# Patient Record
Sex: FEMALE | Race: WHITE | Employment: UNEMPLOYED | URBAN - METROPOLITAN AREA
[De-identification: names, ages, dates, MRNs, and addresses within clinical notes are randomized per-mention and may not be internally consistent; named-entity substitution may affect disease eponyms.]

---

## 2017-02-15 ENCOUNTER — OFFICE VISIT (OUTPATIENT)
Dept: FAMILY MEDICINE CLINIC | Age: 37
End: 2017-02-15

## 2017-02-15 VITALS
RESPIRATION RATE: 16 BRPM | WEIGHT: 202 LBS | TEMPERATURE: 98.8 F | HEART RATE: 85 BPM | DIASTOLIC BLOOD PRESSURE: 62 MMHG | HEIGHT: 69 IN | SYSTOLIC BLOOD PRESSURE: 106 MMHG | BODY MASS INDEX: 29.92 KG/M2 | OXYGEN SATURATION: 98 %

## 2017-02-15 DIAGNOSIS — K52.9 ACUTE GASTROENTERITIS: Primary | ICD-10-CM

## 2017-02-15 PROCEDURE — 99214 OFFICE O/P EST MOD 30 MIN: CPT | Performed by: FAMILY MEDICINE

## 2017-02-15 RX ORDER — ONDANSETRON 4 MG/1
4 TABLET, FILM COATED ORAL EVERY 8 HOURS PRN
Qty: 30 TABLET | Refills: 0 | Status: SHIPPED | OUTPATIENT
Start: 2017-02-15 | End: 2018-12-05 | Stop reason: CLARIF

## 2017-02-15 ASSESSMENT — ENCOUNTER SYMPTOMS
SHORTNESS OF BREATH: 0
RECTAL PAIN: 0
BACK PAIN: 0
CHOKING: 0
DIARRHEA: 1
NAUSEA: 1
CHEST TIGHTNESS: 0
VOMITING: 1
BLOOD IN STOOL: 0
STRIDOR: 0
ABDOMINAL DISTENTION: 0
WHEEZING: 0
ANAL BLEEDING: 0
CONSTIPATION: 0
ABDOMINAL PAIN: 0
COUGH: 0
APNEA: 0

## 2017-03-30 ENCOUNTER — OFFICE VISIT (OUTPATIENT)
Dept: FAMILY MEDICINE CLINIC | Age: 37
End: 2017-03-30

## 2017-03-30 VITALS
HEIGHT: 69 IN | BODY MASS INDEX: 30.18 KG/M2 | WEIGHT: 203.8 LBS | OXYGEN SATURATION: 97 % | DIASTOLIC BLOOD PRESSURE: 78 MMHG | HEART RATE: 98 BPM | SYSTOLIC BLOOD PRESSURE: 112 MMHG | TEMPERATURE: 99 F

## 2017-03-30 DIAGNOSIS — J20.9 ACUTE BRONCHITIS, UNSPECIFIED ORGANISM: ICD-10-CM

## 2017-03-30 DIAGNOSIS — J01.10 ACUTE NON-RECURRENT FRONTAL SINUSITIS: ICD-10-CM

## 2017-03-30 PROCEDURE — 99213 OFFICE O/P EST LOW 20 MIN: CPT | Performed by: NURSE PRACTITIONER

## 2017-03-30 RX ORDER — AZITHROMYCIN 250 MG/1
TABLET, FILM COATED ORAL
Qty: 1 PACKET | Refills: 0 | Status: SHIPPED | OUTPATIENT
Start: 2017-03-30 | End: 2017-07-20 | Stop reason: SDUPTHER

## 2017-03-30 RX ORDER — CHOLECALCIFEROL (VITAMIN D3) 125 MCG
500 CAPSULE ORAL DAILY
COMMUNITY
End: 2022-01-24

## 2017-03-30 RX ORDER — PREDNISONE 20 MG/1
20 TABLET ORAL DAILY
Qty: 5 TABLET | Refills: 0 | Status: SHIPPED | OUTPATIENT
Start: 2017-03-30 | End: 2017-07-20 | Stop reason: SDUPTHER

## 2017-03-30 RX ORDER — ALBUTEROL SULFATE 90 UG/1
2 AEROSOL, METERED RESPIRATORY (INHALATION) EVERY 4 HOURS PRN
Qty: 2 INHALER | Refills: 3 | Status: SHIPPED | OUTPATIENT
Start: 2017-03-30 | End: 2019-05-29 | Stop reason: CLARIF

## 2017-03-30 ASSESSMENT — ENCOUNTER SYMPTOMS
ALLERGIC/IMMUNOLOGIC NEGATIVE: 1
CONSTIPATION: 0
CHOKING: 0
ABDOMINAL PAIN: 0
COUGH: 1
SORE THROAT: 0
FACIAL SWELLING: 0
VOMITING: 0
NAUSEA: 0
DIARRHEA: 0
ABDOMINAL DISTENTION: 0
CHEST TIGHTNESS: 0
SHORTNESS OF BREATH: 0
APNEA: 0
EYES NEGATIVE: 1
COLOR CHANGE: 0
TROUBLE SWALLOWING: 0
BLOOD IN STOOL: 0
SINUS PAIN: 1
VOICE CHANGE: 0
ANAL BLEEDING: 0
WHEEZING: 0
BACK PAIN: 0
RHINORRHEA: 1
SWOLLEN GLANDS: 1
STRIDOR: 0
GASTROINTESTINAL NEGATIVE: 1

## 2017-05-25 ENCOUNTER — OFFICE VISIT (OUTPATIENT)
Dept: FAMILY MEDICINE CLINIC | Age: 37
End: 2017-05-25

## 2017-05-25 VITALS
WEIGHT: 209 LBS | HEART RATE: 86 BPM | BODY MASS INDEX: 30.96 KG/M2 | OXYGEN SATURATION: 98 % | SYSTOLIC BLOOD PRESSURE: 116 MMHG | TEMPERATURE: 99 F | HEIGHT: 69 IN | DIASTOLIC BLOOD PRESSURE: 76 MMHG | RESPIRATION RATE: 16 BRPM

## 2017-05-25 DIAGNOSIS — F17.200 TOBACCO USE DISORDER: ICD-10-CM

## 2017-05-25 DIAGNOSIS — R11.0 NAUSEA: ICD-10-CM

## 2017-05-25 DIAGNOSIS — N92.6 MENSTRUAL PROBLEM: Primary | ICD-10-CM

## 2017-05-25 DIAGNOSIS — N92.6 MENSTRUAL PROBLEM: ICD-10-CM

## 2017-05-25 DIAGNOSIS — Z32.01 PREGNANCY TEST POSITIVE: ICD-10-CM

## 2017-05-25 DIAGNOSIS — E78.6 LOW HDL (UNDER 40): ICD-10-CM

## 2017-05-25 DIAGNOSIS — E78.00 HYPERCHOLESTEREMIA: ICD-10-CM

## 2017-05-25 LAB
CONTROL: NORMAL
GONADOTROPIN, CHORIONIC (HCG) QUANT: <5 MIU/ML
PREGNANCY TEST URINE, POC: NEGATIVE

## 2017-05-25 PROCEDURE — 81025 URINE PREGNANCY TEST: CPT | Performed by: FAMILY MEDICINE

## 2017-05-25 PROCEDURE — 99214 OFFICE O/P EST MOD 30 MIN: CPT | Performed by: FAMILY MEDICINE

## 2017-05-25 ASSESSMENT — ENCOUNTER SYMPTOMS
ABDOMINAL PAIN: 0
BLOOD IN STOOL: 0
CHEST TIGHTNESS: 0
NAUSEA: 0
VOMITING: 0
COUGH: 0
APNEA: 0
CHOKING: 0
WHEEZING: 0
ABDOMINAL DISTENTION: 0
RECTAL PAIN: 0
DIARRHEA: 0
ANAL BLEEDING: 0
STRIDOR: 0
SHORTNESS OF BREATH: 0
CONSTIPATION: 0

## 2017-06-08 ENCOUNTER — OFFICE VISIT (OUTPATIENT)
Dept: FAMILY MEDICINE CLINIC | Age: 37
End: 2017-06-08

## 2017-06-08 VITALS
BODY MASS INDEX: 31.58 KG/M2 | HEIGHT: 68 IN | DIASTOLIC BLOOD PRESSURE: 70 MMHG | SYSTOLIC BLOOD PRESSURE: 112 MMHG | RESPIRATION RATE: 16 BRPM | WEIGHT: 208.4 LBS | HEART RATE: 88 BPM | TEMPERATURE: 98.6 F | OXYGEN SATURATION: 98 %

## 2017-06-08 DIAGNOSIS — B96.89 BACTERIAL VAGINOSIS: ICD-10-CM

## 2017-06-08 DIAGNOSIS — N92.6 IRREGULAR MENSES: ICD-10-CM

## 2017-06-08 DIAGNOSIS — N64.4 PAIN OF BOTH BREASTS: Primary | ICD-10-CM

## 2017-06-08 DIAGNOSIS — R82.998 LEUKOCYTES IN URINE: ICD-10-CM

## 2017-06-08 DIAGNOSIS — N89.8 VAGINAL DISCHARGE: ICD-10-CM

## 2017-06-08 DIAGNOSIS — R10.2 PELVIC PAIN IN FEMALE: ICD-10-CM

## 2017-06-08 DIAGNOSIS — N76.0 BACTERIAL VAGINOSIS: ICD-10-CM

## 2017-06-08 LAB
BILIRUBIN, POC: NORMAL
BLOOD URINE, POC: ABNORMAL
CLARITY, POC: ABNORMAL
COLOR, POC: YELLOW
CONTROL: NORMAL
GLUCOSE URINE, POC: NORMAL
KETONES, POC: NEGATIVE
LEUKOCYTE EST, POC: ABNORMAL
NITRITE, POC: NEGATIVE
PH, POC: 5
PREGNANCY TEST URINE, POC: NEGATIVE
PROTEIN, POC: NEGATIVE
SPECIFIC GRAVITY, POC: 1.02
UROBILINOGEN, POC: NORMAL

## 2017-06-08 PROCEDURE — 81002 URINALYSIS NONAUTO W/O SCOPE: CPT | Performed by: FAMILY MEDICINE

## 2017-06-08 PROCEDURE — 99214 OFFICE O/P EST MOD 30 MIN: CPT | Performed by: FAMILY MEDICINE

## 2017-06-08 PROCEDURE — 81025 URINE PREGNANCY TEST: CPT | Performed by: FAMILY MEDICINE

## 2017-06-08 PROCEDURE — 87210 SMEAR WET MOUNT SALINE/INK: CPT | Performed by: FAMILY MEDICINE

## 2017-06-08 RX ORDER — NAPROXEN 500 MG/1
500 TABLET ORAL 2 TIMES DAILY WITH MEALS
Qty: 20 TABLET | Refills: 0 | Status: SHIPPED | OUTPATIENT
Start: 2017-06-08 | End: 2018-12-05 | Stop reason: CLARIF

## 2017-06-08 RX ORDER — METRONIDAZOLE 500 MG/1
500 TABLET ORAL 2 TIMES DAILY
Qty: 14 TABLET | Refills: 0 | Status: SHIPPED | OUTPATIENT
Start: 2017-06-08 | End: 2017-06-15

## 2017-06-08 ASSESSMENT — ENCOUNTER SYMPTOMS
WHEEZING: 0
VOMITING: 0
APNEA: 0
COUGH: 0
NAUSEA: 0
CONSTIPATION: 0
CHEST TIGHTNESS: 0
ABDOMINAL PAIN: 0
DIARRHEA: 0
ABDOMINAL DISTENTION: 0
RECTAL PAIN: 0
CHOKING: 0
ANAL BLEEDING: 0
SHORTNESS OF BREATH: 0
STRIDOR: 0
BLOOD IN STOOL: 0

## 2017-06-09 ENCOUNTER — TELEPHONE (OUTPATIENT)
Dept: FAMILY MEDICINE CLINIC | Age: 37
End: 2017-06-09

## 2017-06-09 LAB
C TRACH DNA GENITAL QL NAA+PROBE: NEGATIVE
N. GONORRHOEAE DNA: NEGATIVE

## 2017-06-09 RX ORDER — CIPROFLOXACIN 250 MG/1
250 TABLET, FILM COATED ORAL 2 TIMES DAILY
Qty: 10 TABLET | Refills: 0 | Status: SHIPPED | OUTPATIENT
Start: 2017-06-09 | End: 2017-06-12 | Stop reason: ALTCHOICE

## 2017-06-10 LAB
ORGANISM: ABNORMAL
URINE CULTURE, ROUTINE: ABNORMAL

## 2017-06-12 ENCOUNTER — TELEPHONE (OUTPATIENT)
Dept: FAMILY MEDICINE CLINIC | Age: 37
End: 2017-06-12

## 2017-06-12 DIAGNOSIS — N30.00 ACUTE CYSTITIS WITHOUT HEMATURIA: Primary | ICD-10-CM

## 2017-06-12 RX ORDER — NITROFURANTOIN 25; 75 MG/1; MG/1
100 CAPSULE ORAL 2 TIMES DAILY
Qty: 14 CAPSULE | Refills: 0 | Status: SHIPPED | OUTPATIENT
Start: 2017-06-12 | End: 2017-06-19

## 2017-06-12 RX ORDER — NITROFURANTOIN 25; 75 MG/1; MG/1
100 CAPSULE ORAL 2 TIMES DAILY
Qty: 14 CAPSULE | Refills: 0 | OUTPATIENT
Start: 2017-06-12 | End: 2017-06-12 | Stop reason: SDUPTHER

## 2017-06-14 ENCOUNTER — TELEPHONE (OUTPATIENT)
Dept: FAMILY MEDICINE CLINIC | Age: 37
End: 2017-06-14

## 2017-06-14 DIAGNOSIS — R10.9 ABDOMINAL PAIN, UNSPECIFIED LOCATION: Primary | ICD-10-CM

## 2017-06-14 DIAGNOSIS — R19.00 ABDOMINAL SWELLING: ICD-10-CM

## 2017-06-15 ENCOUNTER — TELEPHONE (OUTPATIENT)
Dept: FAMILY MEDICINE CLINIC | Age: 37
End: 2017-06-15

## 2017-06-15 DIAGNOSIS — F41.9 ANXIETY: ICD-10-CM

## 2017-06-15 DIAGNOSIS — F41.0 PANIC ATTACK: ICD-10-CM

## 2017-06-15 DIAGNOSIS — F33.1 MAJOR DEPRESSIVE DISORDER, RECURRENT EPISODE, MODERATE (HCC): ICD-10-CM

## 2017-06-15 RX ORDER — FLUOXETINE HYDROCHLORIDE 40 MG/1
CAPSULE ORAL
Qty: 90 CAPSULE | Refills: 0 | Status: SHIPPED | OUTPATIENT
Start: 2017-06-15 | End: 2017-09-08 | Stop reason: SDUPTHER

## 2017-06-27 ENCOUNTER — OFFICE VISIT (OUTPATIENT)
Dept: FAMILY MEDICINE CLINIC | Age: 37
End: 2017-06-27

## 2017-06-27 VITALS
RESPIRATION RATE: 16 BRPM | SYSTOLIC BLOOD PRESSURE: 112 MMHG | HEIGHT: 68 IN | BODY MASS INDEX: 31.42 KG/M2 | TEMPERATURE: 98.6 F | WEIGHT: 207.3 LBS | HEART RATE: 65 BPM | OXYGEN SATURATION: 98 % | DIASTOLIC BLOOD PRESSURE: 76 MMHG

## 2017-06-27 DIAGNOSIS — N20.0 RENAL STONE: ICD-10-CM

## 2017-06-27 DIAGNOSIS — R10.2 PELVIC PAIN IN FEMALE: ICD-10-CM

## 2017-06-27 DIAGNOSIS — R91.1 LUNG NODULE: ICD-10-CM

## 2017-06-27 DIAGNOSIS — K21.9 GASTROESOPHAGEAL REFLUX DISEASE WITHOUT ESOPHAGITIS: ICD-10-CM

## 2017-06-27 DIAGNOSIS — R16.0 LIVER MASS, RIGHT LOBE: ICD-10-CM

## 2017-06-27 DIAGNOSIS — R10.84 ABDOMINAL PAIN, GENERALIZED: Primary | ICD-10-CM

## 2017-06-27 DIAGNOSIS — F17.200 TOBACCO USE DISORDER: ICD-10-CM

## 2017-06-27 DIAGNOSIS — D18.03 LIVER HEMANGIOMA: ICD-10-CM

## 2017-06-27 DIAGNOSIS — N64.4 PAIN OF BOTH BREASTS: ICD-10-CM

## 2017-06-27 DIAGNOSIS — K44.9 HIATAL HERNIA: ICD-10-CM

## 2017-06-27 PROCEDURE — 99214 OFFICE O/P EST MOD 30 MIN: CPT | Performed by: FAMILY MEDICINE

## 2017-06-27 RX ORDER — PANTOPRAZOLE SODIUM 20 MG/1
20 TABLET, DELAYED RELEASE ORAL DAILY
Qty: 30 TABLET | Refills: 3 | Status: SHIPPED | OUTPATIENT
Start: 2017-06-27 | End: 2018-03-16 | Stop reason: SDUPTHER

## 2017-06-27 ASSESSMENT — ENCOUNTER SYMPTOMS
APNEA: 0
CHOKING: 0
SHORTNESS OF BREATH: 0
CONSTIPATION: 0
ANAL BLEEDING: 0
VOMITING: 0
COUGH: 0
STRIDOR: 0
WHEEZING: 0
ABDOMINAL PAIN: 0
CHEST TIGHTNESS: 0
ABDOMINAL DISTENTION: 0
RECTAL PAIN: 0
NAUSEA: 0
BLOOD IN STOOL: 0
DIARRHEA: 0

## 2017-07-01 ENCOUNTER — HOSPITAL ENCOUNTER (OUTPATIENT)
Dept: GENERAL RADIOLOGY | Facility: MEDICAL CENTER | Age: 37
Discharge: HOME | End: 2017-07-01
Attending: FAMILY MEDICINE | Admitting: FAMILY MEDICINE

## 2017-07-01 DIAGNOSIS — R91.1 LUNG NODULE: ICD-10-CM

## 2017-07-01 DIAGNOSIS — F17.200 TOBACCO USE DISORDER: ICD-10-CM

## 2017-07-05 DIAGNOSIS — D18.03 LIVER HEMANGIOMA: ICD-10-CM

## 2017-07-05 DIAGNOSIS — K76.9 LIVER LESION: Primary | ICD-10-CM

## 2017-07-20 ENCOUNTER — OFFICE VISIT (OUTPATIENT)
Dept: FAMILY MEDICINE CLINIC | Age: 37
End: 2017-07-20

## 2017-07-20 ENCOUNTER — TELEPHONE (OUTPATIENT)
Dept: FAMILY MEDICINE CLINIC | Age: 37
End: 2017-07-20

## 2017-07-20 VITALS
OXYGEN SATURATION: 99 % | SYSTOLIC BLOOD PRESSURE: 120 MMHG | WEIGHT: 203.5 LBS | HEART RATE: 72 BPM | TEMPERATURE: 98.8 F | BODY MASS INDEX: 30.14 KG/M2 | DIASTOLIC BLOOD PRESSURE: 78 MMHG | HEIGHT: 69 IN

## 2017-07-20 DIAGNOSIS — J01.10 ACUTE NON-RECURRENT FRONTAL SINUSITIS: ICD-10-CM

## 2017-07-20 DIAGNOSIS — Z11.4 SCREENING FOR HIV WITHOUT PRESENCE OF RISK FACTORS: ICD-10-CM

## 2017-07-20 DIAGNOSIS — Z11.4 SCREENING FOR HIV WITHOUT PRESENCE OF RISK FACTORS: Primary | ICD-10-CM

## 2017-07-20 PROCEDURE — 99213 OFFICE O/P EST LOW 20 MIN: CPT | Performed by: NURSE PRACTITIONER

## 2017-07-20 RX ORDER — AZITHROMYCIN 250 MG/1
TABLET, FILM COATED ORAL
Qty: 1 PACKET | Refills: 0 | Status: SHIPPED | OUTPATIENT
Start: 2017-07-20 | End: 2017-07-30

## 2017-07-20 RX ORDER — PREDNISONE 20 MG/1
20 TABLET ORAL DAILY
Qty: 5 TABLET | Refills: 0 | Status: SHIPPED | OUTPATIENT
Start: 2017-07-20 | End: 2017-07-25

## 2017-07-20 ASSESSMENT — ENCOUNTER SYMPTOMS
BLOOD IN STOOL: 0
GASTROINTESTINAL NEGATIVE: 1
BACK PAIN: 0
STRIDOR: 0
SHORTNESS OF BREATH: 0
WHEEZING: 0
RHINORRHEA: 1
DIARRHEA: 0
COLOR CHANGE: 0
CHOKING: 0
NAUSEA: 0
ALLERGIC/IMMUNOLOGIC NEGATIVE: 1
ABDOMINAL DISTENTION: 0
SWOLLEN GLANDS: 1
TROUBLE SWALLOWING: 0
COUGH: 1
CONSTIPATION: 0
ANAL BLEEDING: 0
EYES NEGATIVE: 1
APNEA: 0
VOICE CHANGE: 0
FACIAL SWELLING: 0
CHEST TIGHTNESS: 0
VOMITING: 0
SINUS PAIN: 1
ABDOMINAL PAIN: 0
SORE THROAT: 1

## 2017-07-21 LAB — HIV-1 AND HIV-2 ANTIBODIES: NORMAL

## 2017-08-02 ENCOUNTER — TELEPHONE (OUTPATIENT)
Dept: FAMILY MEDICINE CLINIC | Age: 37
End: 2017-08-02

## 2017-08-15 ENCOUNTER — TELEPHONE (OUTPATIENT)
Dept: FAMILY MEDICINE CLINIC | Age: 37
End: 2017-08-15

## 2017-09-08 ENCOUNTER — TELEPHONE (OUTPATIENT)
Dept: FAMILY MEDICINE CLINIC | Age: 37
End: 2017-09-08

## 2017-09-08 DIAGNOSIS — F33.1 MAJOR DEPRESSIVE DISORDER, RECURRENT EPISODE, MODERATE (HCC): ICD-10-CM

## 2017-09-08 DIAGNOSIS — F41.0 PANIC ATTACK: ICD-10-CM

## 2017-09-08 DIAGNOSIS — F41.9 ANXIETY: ICD-10-CM

## 2017-09-08 RX ORDER — FLUOXETINE HYDROCHLORIDE 40 MG/1
CAPSULE ORAL
Qty: 90 CAPSULE | Refills: 0 | Status: SHIPPED | OUTPATIENT
Start: 2017-09-08 | End: 2017-12-12 | Stop reason: SDUPTHER

## 2017-09-12 ENCOUNTER — TELEPHONE (OUTPATIENT)
Dept: FAMILY MEDICINE CLINIC | Age: 37
End: 2017-09-12

## 2017-09-26 ENCOUNTER — TELEPHONE (OUTPATIENT)
Dept: FAMILY MEDICINE CLINIC | Age: 37
End: 2017-09-26

## 2017-09-28 ENCOUNTER — OFFICE VISIT (OUTPATIENT)
Dept: FAMILY MEDICINE CLINIC | Age: 37
End: 2017-09-28

## 2017-09-28 VITALS
SYSTOLIC BLOOD PRESSURE: 122 MMHG | WEIGHT: 206 LBS | HEART RATE: 93 BPM | TEMPERATURE: 99 F | DIASTOLIC BLOOD PRESSURE: 70 MMHG | OXYGEN SATURATION: 99 % | BODY MASS INDEX: 30.87 KG/M2

## 2017-09-28 DIAGNOSIS — R10.2 PELVIC PAIN IN FEMALE: ICD-10-CM

## 2017-09-28 DIAGNOSIS — R91.1 LUNG NODULE: ICD-10-CM

## 2017-09-28 DIAGNOSIS — K63.89 MASS OF COLON: ICD-10-CM

## 2017-09-28 DIAGNOSIS — R10.84 GENERALIZED ABDOMINAL PAIN: ICD-10-CM

## 2017-09-28 DIAGNOSIS — D25.9 UTERINE LEIOMYOMA, UNSPECIFIED LOCATION: ICD-10-CM

## 2017-09-28 DIAGNOSIS — K21.9 GASTROESOPHAGEAL REFLUX DISEASE WITHOUT ESOPHAGITIS: ICD-10-CM

## 2017-09-28 DIAGNOSIS — K59.00 CONSTIPATION, UNSPECIFIED CONSTIPATION TYPE: ICD-10-CM

## 2017-09-28 DIAGNOSIS — R32 URINARY INCONTINENCE, UNSPECIFIED TYPE: Primary | ICD-10-CM

## 2017-09-28 DIAGNOSIS — N20.0 RENAL STONE: ICD-10-CM

## 2017-09-28 DIAGNOSIS — K76.9 LIVER LESION: ICD-10-CM

## 2017-09-28 LAB
BILIRUBIN, POC: NORMAL
BLOOD URINE, POC: NORMAL
CLARITY, POC: CLEAR
COLOR, POC: YELLOW
GLUCOSE URINE, POC: NORMAL
KETONES, POC: NORMAL
LEUKOCYTE EST, POC: NORMAL
NITRITE, POC: NORMAL
PH, POC: 6
PROTEIN, POC: NORMAL
SPECIFIC GRAVITY, POC: 1.02
UROBILINOGEN, POC: NORMAL

## 2017-09-28 PROCEDURE — 99214 OFFICE O/P EST MOD 30 MIN: CPT | Performed by: FAMILY MEDICINE

## 2017-09-28 PROCEDURE — 81002 URINALYSIS NONAUTO W/O SCOPE: CPT | Performed by: FAMILY MEDICINE

## 2017-09-28 ASSESSMENT — ENCOUNTER SYMPTOMS
STRIDOR: 0
RECTAL PAIN: 0
VOMITING: 0
ANAL BLEEDING: 0
CHOKING: 0
BLOOD IN STOOL: 0
SHORTNESS OF BREATH: 0
WHEEZING: 0
ABDOMINAL DISTENTION: 0
COUGH: 0
DIARRHEA: 0
ABDOMINAL PAIN: 1
APNEA: 0
CHEST TIGHTNESS: 0
NAUSEA: 0
CONSTIPATION: 0

## 2017-09-29 LAB
C. TRACHOMATIS DNA ,URINE: NEGATIVE
HAV IGM SER IA-ACNC: NORMAL
HEPATITIS B CORE IGM ANTIBODY: NORMAL
HEPATITIS B SURFACE ANTIGEN INTERPRETATION: NORMAL
HEPATITIS C ANTIBODY INTERPRETATION: NORMAL
N. GONORRHOEAE DNA, URINE: NEGATIVE

## 2017-12-12 ENCOUNTER — TELEPHONE (OUTPATIENT)
Dept: FAMILY MEDICINE CLINIC | Age: 37
End: 2017-12-12

## 2017-12-12 DIAGNOSIS — F41.9 ANXIETY: ICD-10-CM

## 2017-12-12 DIAGNOSIS — F41.0 PANIC ATTACK: ICD-10-CM

## 2017-12-12 DIAGNOSIS — F33.1 MAJOR DEPRESSIVE DISORDER, RECURRENT EPISODE, MODERATE (HCC): ICD-10-CM

## 2017-12-12 RX ORDER — FLUOXETINE HYDROCHLORIDE 40 MG/1
CAPSULE ORAL
Qty: 90 CAPSULE | Refills: 0 | Status: SHIPPED | OUTPATIENT
Start: 2017-12-12 | End: 2018-03-16 | Stop reason: SDUPTHER

## 2017-12-12 NOTE — TELEPHONE ENCOUNTER
Pt needs refills, pt was off work about a month ago. Kimberlee Neither mayito wanted her to do another test but she didn't like him. So if you can give her some where knew to go, If you fell she needs to have it done.     FLUoxetine (PROZAC) 40 MG capsule    Please advise  Yaw Perez [phone]

## 2018-03-16 ENCOUNTER — TELEPHONE (OUTPATIENT)
Dept: FAMILY MEDICINE CLINIC | Age: 38
End: 2018-03-16

## 2018-03-16 DIAGNOSIS — F41.9 ANXIETY: ICD-10-CM

## 2018-03-16 DIAGNOSIS — F33.1 MAJOR DEPRESSIVE DISORDER, RECURRENT EPISODE, MODERATE (HCC): ICD-10-CM

## 2018-03-16 DIAGNOSIS — F41.0 PANIC ATTACK: ICD-10-CM

## 2018-03-16 DIAGNOSIS — K21.9 GASTROESOPHAGEAL REFLUX DISEASE WITHOUT ESOPHAGITIS: ICD-10-CM

## 2018-03-16 RX ORDER — FLUOXETINE HYDROCHLORIDE 40 MG/1
CAPSULE ORAL
Qty: 90 CAPSULE | Refills: 0 | Status: SHIPPED | OUTPATIENT
Start: 2018-03-16 | End: 2018-06-15 | Stop reason: SDUPTHER

## 2018-03-16 RX ORDER — PANTOPRAZOLE SODIUM 20 MG/1
20 TABLET, DELAYED RELEASE ORAL DAILY
Qty: 30 TABLET | Refills: 2 | Status: SHIPPED | OUTPATIENT
Start: 2018-03-16 | End: 2019-05-29 | Stop reason: CLARIF

## 2018-06-15 DIAGNOSIS — F41.9 ANXIETY: ICD-10-CM

## 2018-06-15 DIAGNOSIS — F41.0 PANIC ATTACK: ICD-10-CM

## 2018-06-15 DIAGNOSIS — F33.1 MAJOR DEPRESSIVE DISORDER, RECURRENT EPISODE, MODERATE (HCC): ICD-10-CM

## 2018-06-15 RX ORDER — FLUOXETINE HYDROCHLORIDE 40 MG/1
CAPSULE ORAL
Qty: 90 CAPSULE | Refills: 0 | Status: SHIPPED | OUTPATIENT
Start: 2018-06-15 | End: 2018-10-26 | Stop reason: SDUPTHER

## 2018-08-04 ENCOUNTER — OFFICE VISIT (OUTPATIENT)
Dept: FAMILY MEDICINE CLINIC | Age: 38
End: 2018-08-04

## 2018-08-04 VITALS
BODY MASS INDEX: 25.18 KG/M2 | HEART RATE: 96 BPM | WEIGHT: 170 LBS | DIASTOLIC BLOOD PRESSURE: 64 MMHG | HEIGHT: 69 IN | RESPIRATION RATE: 16 BRPM | SYSTOLIC BLOOD PRESSURE: 110 MMHG | OXYGEN SATURATION: 98 % | TEMPERATURE: 97.4 F

## 2018-08-04 DIAGNOSIS — N39.0 URINARY TRACT INFECTION WITHOUT HEMATURIA, SITE UNSPECIFIED: Primary | ICD-10-CM

## 2018-08-04 PROCEDURE — 99213 OFFICE O/P EST LOW 20 MIN: CPT | Performed by: INTERNAL MEDICINE

## 2018-08-04 RX ORDER — FLUCONAZOLE 150 MG/1
150 TABLET ORAL ONCE
Qty: 2 TABLET | Refills: 0 | Status: SHIPPED | OUTPATIENT
Start: 2018-08-04 | End: 2018-08-04

## 2018-08-04 RX ORDER — NITROFURANTOIN 25; 75 MG/1; MG/1
100 CAPSULE ORAL 2 TIMES DAILY
Qty: 14 CAPSULE | Refills: 0 | Status: SHIPPED | OUTPATIENT
Start: 2018-08-04 | End: 2018-08-11

## 2018-08-04 ASSESSMENT — ENCOUNTER SYMPTOMS
BACK PAIN: 1
NAUSEA: 0
ABDOMINAL PAIN: 1
VOMITING: 0

## 2018-08-04 NOTE — PROGRESS NOTES
Subjective:      Patient ID: Stuart Mcdonnell is a 40 y.o. female. 2 day history of dysuria, subjective fever and chills, flank pain bilaterally, low abdominal pain. Feels similar to previous urinary tract infection. Took some AZO OTC. Symptoms have worsened since yesterday. Review of Systems   Constitutional: Positive for chills, fatigue and fever. Negative for activity change and appetite change. Gastrointestinal: Positive for abdominal pain. Negative for nausea and vomiting. Genitourinary: Positive for difficulty urinating, flank pain, frequency and urgency. Musculoskeletal: Positive for back pain. Objective:   Physical Exam   Constitutional: She appears well-developed and well-nourished. No distress. Abdominal: Soft. Bowel sounds are normal. She exhibits no distension and no mass. There is tenderness. There is no rebound and no guarding. No CVA tenderness       Assessment/Plan:  Jade Wright was seen today for flank pain and polyuria. Diagnoses and all orders for this visit:    Urinary tract infection without hematuria, site unspecified  -     URINE CULTURE      -     nitrofurantoin, macrocrystal-monohydrate, (MACROBID) 100 MG capsule; Take 1 capsule by mouth 2 times daily for 7 days    Typically gets yeast infection associated with antibiotic treatment. -     fluconazole (DIFLUCAN) 150 MG tablet; Take 1 tablet by mouth once for 1 dose May repeat in 3-5 days, if symptoms persist or recur.

## 2018-08-06 LAB
ORGANISM: ABNORMAL
URINE CULTURE, ROUTINE: ABNORMAL
URINE CULTURE, ROUTINE: ABNORMAL

## 2018-08-08 ENCOUNTER — TELEPHONE (OUTPATIENT)
Dept: FAMILY MEDICINE CLINIC | Age: 38
End: 2018-08-08

## 2018-08-08 NOTE — TELEPHONE ENCOUNTER
[phone] [phone]   Breana Quintanilla    Missed our call regarding test results. Please call again. OK to leave detailed voice mail with information.     Last seen 8/4/2018

## 2018-10-16 NOTE — TELEPHONE ENCOUNTER
Notify pt':  Med e-scribed. GI:advise pt' to check with insurance company for in- & let us know so referral can be placed. none

## 2018-10-26 DIAGNOSIS — F33.1 MAJOR DEPRESSIVE DISORDER, RECURRENT EPISODE, MODERATE (HCC): ICD-10-CM

## 2018-10-26 DIAGNOSIS — F41.0 PANIC ATTACK: ICD-10-CM

## 2018-10-26 DIAGNOSIS — F41.9 ANXIETY: ICD-10-CM

## 2018-10-26 RX ORDER — FLUOXETINE HYDROCHLORIDE 40 MG/1
CAPSULE ORAL
Qty: 30 CAPSULE | Refills: 0 | Status: SHIPPED | OUTPATIENT
Start: 2018-10-26 | End: 2018-12-05 | Stop reason: SDUPTHER

## 2018-12-05 ENCOUNTER — OFFICE VISIT (OUTPATIENT)
Dept: FAMILY MEDICINE CLINIC | Age: 38
End: 2018-12-05
Payer: COMMERCIAL

## 2018-12-05 VITALS
TEMPERATURE: 98 F | HEART RATE: 85 BPM | HEIGHT: 69 IN | RESPIRATION RATE: 16 BRPM | WEIGHT: 172 LBS | BODY MASS INDEX: 25.48 KG/M2 | SYSTOLIC BLOOD PRESSURE: 121 MMHG | DIASTOLIC BLOOD PRESSURE: 81 MMHG | OXYGEN SATURATION: 100 %

## 2018-12-05 DIAGNOSIS — F17.200 TOBACCO USE DISORDER: ICD-10-CM

## 2018-12-05 DIAGNOSIS — F41.9 ANXIETY: ICD-10-CM

## 2018-12-05 DIAGNOSIS — F33.1 MAJOR DEPRESSIVE DISORDER, RECURRENT EPISODE, MODERATE (HCC): ICD-10-CM

## 2018-12-05 DIAGNOSIS — E78.6 LOW HDL (UNDER 40): ICD-10-CM

## 2018-12-05 DIAGNOSIS — J01.00 ACUTE NON-RECURRENT MAXILLARY SINUSITIS: Primary | ICD-10-CM

## 2018-12-05 DIAGNOSIS — F41.0 PANIC ATTACK: ICD-10-CM

## 2018-12-05 PROCEDURE — 99214 OFFICE O/P EST MOD 30 MIN: CPT | Performed by: FAMILY MEDICINE

## 2018-12-05 RX ORDER — AMOXICILLIN AND CLAVULANATE POTASSIUM 875; 125 MG/1; MG/1
1 TABLET, FILM COATED ORAL 2 TIMES DAILY
Qty: 14 TABLET | Refills: 0 | Status: SHIPPED | OUTPATIENT
Start: 2018-12-05 | End: 2018-12-12

## 2018-12-05 RX ORDER — FLUOXETINE HYDROCHLORIDE 40 MG/1
40 CAPSULE ORAL DAILY
Qty: 30 CAPSULE | Refills: 2 | Status: SHIPPED | OUTPATIENT
Start: 2018-12-05 | End: 2019-09-18 | Stop reason: SDUPTHER

## 2018-12-05 RX ORDER — FLUTICASONE PROPIONATE 50 MCG
1 SPRAY, SUSPENSION (ML) NASAL DAILY
Qty: 1 BOTTLE | Refills: 0 | Status: SHIPPED | OUTPATIENT
Start: 2018-12-05 | End: 2019-05-29 | Stop reason: CLARIF

## 2018-12-05 RX ORDER — GUAIFENESIN 600 MG/1
1200 TABLET, EXTENDED RELEASE ORAL 2 TIMES DAILY
Qty: 28 TABLET | Refills: 1 | Status: SHIPPED | OUTPATIENT
Start: 2018-12-05 | End: 2019-05-29 | Stop reason: CLARIF

## 2018-12-05 ASSESSMENT — ENCOUNTER SYMPTOMS
NAUSEA: 0
SINUS PRESSURE: 1
VOICE CHANGE: 0
WHEEZING: 0
APNEA: 0
DIARRHEA: 0
CONSTIPATION: 0
RECTAL PAIN: 0
ABDOMINAL DISTENTION: 0
RHINORRHEA: 1
TROUBLE SWALLOWING: 0
SHORTNESS OF BREATH: 0
BLOOD IN STOOL: 0
FACIAL SWELLING: 0
SINUS PAIN: 1
VOMITING: 0
COUGH: 0
ANAL BLEEDING: 0
STRIDOR: 0
CHEST TIGHTNESS: 0
CHOKING: 0
ABDOMINAL PAIN: 0
SORE THROAT: 0
EYES NEGATIVE: 1

## 2018-12-05 ASSESSMENT — PATIENT HEALTH QUESTIONNAIRE - PHQ9
1. LITTLE INTEREST OR PLEASURE IN DOING THINGS: 0
2. FEELING DOWN, DEPRESSED OR HOPELESS: 0
SUM OF ALL RESPONSES TO PHQ QUESTIONS 1-9: 0
SUM OF ALL RESPONSES TO PHQ9 QUESTIONS 1 & 2: 0
SUM OF ALL RESPONSES TO PHQ QUESTIONS 1-9: 0

## 2018-12-05 NOTE — PROGRESS NOTES
Trachea normal and normal range of motion. Neck supple. No Brudzinski's sign noted. Cardiovascular: Normal rate, regular rhythm, normal heart sounds, intact distal pulses and normal pulses. Pulmonary/Chest: Effort normal and breath sounds normal. No accessory muscle usage. No tachypnea and no bradypnea. No respiratory distress. She has no decreased breath sounds. She has no wheezes. She has no rhonchi. She has no rales. Good AE bilaterally     Abdominal: Soft. Normal appearance and bowel sounds are normal. She exhibits no distension and no mass. There is no splenomegaly or hepatomegaly. There is no tenderness. Lymphadenopathy:        Head (right side): No submental, no submandibular, no tonsillar, no preauricular, no posterior auricular and no occipital adenopathy present. Head (left side): No submental, no submandibular, no tonsillar, no preauricular, no posterior auricular and no occipital adenopathy present. She has no cervical adenopathy. Right cervical: No superficial cervical, no deep cervical and no posterior cervical adenopathy present. Left cervical: No superficial cervical, no deep cervical and no posterior cervical adenopathy present. Neurological: She is alert and oriented to person, place, and time. Skin: Skin is warm, dry and intact. No rash noted. She is not diaphoretic. No cyanosis. No pallor. Good skin turgor. Capillary refill=2-3 secs. Psychiatric: She has a normal mood and affect. Her speech is normal and behavior is normal. Judgment and thought content normal. Cognition and memory are normal. She expresses no homicidal and no suicidal ideation. Good eye contact. Assessment:        Diagnosis Orders   1. Acute non-recurrent maxillary sinusitis  VSS/well appearing. Possible med side effects reviewed. Pt' wishes to proceed w/meds.   Supportive tx & abx:Warm compresses/steam bowl inhalation/OTC tylenol alternating w/motrin (not exceeding max daily dose as discussed)/anthistamine prn.    amoxicillin-clavulanate (AUGMENTIN) 875-125 MG per tablet    guaiFENesin (MUCINEX) 600 MG extended release tablet    fluticasone (FLONASE) 50 MCG/ACT nasal spray   2. Low HDL (under 40)  Comprehensive Metabolic Panel    Lipid Panel  Labs due. 3. Major depression(HCC)  Stable. FLUoxetine (PROZAC) 40 MG capsule   4. Anxiety  Stable. FLUoxetine (PROZAC) 40 MG capsule   5. Panic attack  Stable. FLUoxetine (PROZAC) 40 MG capsule   6. Tobacco use disorder  Counseled. Strongly encouraged to quit. Plan:      Pt' left office in good condition. Call or return to clinic prn if these symptoms worsen or fail to improve as anticipated. Advised to go to local ER or call 911 for any worrisome signs/sx including but not limited to worsening of current complaint or development of resp distress/dysphagia/odynophagia/sob/dizziness/appetite loss/high fever/rash.

## 2018-12-05 NOTE — PATIENT INSTRUCTIONS
Patient Education        Sinusitis: Care Instructions  Your Care Instructions    Sinusitis is an infection of the lining of the sinus cavities in your head. Sinusitis often follows a cold. It causes pain and pressure in your head and face. In most cases, sinusitis gets better on its own in 1 to 2 weeks. But some mild symptoms may last for several weeks. Sometimes antibiotics are needed. Follow-up care is a key part of your treatment and safety. Be sure to make and go to all appointments, and call your doctor if you are having problems. It's also a good idea to know your test results and keep a list of the medicines you take. How can you care for yourself at home? · Take an over-the-counter pain medicine, such as acetaminophen (Tylenol), ibuprofen (Advil, Motrin), or naproxen (Aleve). Read and follow all instructions on the label. · If the doctor prescribed antibiotics, take them as directed. Do not stop taking them just because you feel better. You need to take the full course of antibiotics. · Be careful when taking over-the-counter cold or flu medicines and Tylenol at the same time. Many of these medicines have acetaminophen, which is Tylenol. Read the labels to make sure that you are not taking more than the recommended dose. Too much acetaminophen (Tylenol) can be harmful. · Breathe warm, moist air from a steamy shower, a hot bath, or a sink filled with hot water. Avoid cold, dry air. Using a humidifier in your home may help. Follow the directions for cleaning the machine. · Use saline (saltwater) nasal washes to help keep your nasal passages open and wash out mucus and bacteria. You can buy saline nose drops at a grocery store or drugstore. Or you can make your own at home by adding 1 teaspoon of salt and 1 teaspoon of baking soda to 2 cups of distilled water. If you make your own, fill a bulb syringe with the solution, insert the tip into your nostril, and squeeze gently. Patrizia Lisandro your nose.   · Put a hot, wet towel or a warm gel pack on your face 3 or 4 times a day for 5 to 10 minutes each time. · Try a decongestant nasal spray like oxymetazoline (Afrin). Do not use it for more than 3 days in a row. Using it for more than 3 days can make your congestion worse. When should you call for help? Call your doctor now or seek immediate medical care if:    · You have new or worse swelling or redness in your face or around your eyes.     · You have a new or higher fever.    Watch closely for changes in your health, and be sure to contact your doctor if:    · You have new or worse facial pain.     · The mucus from your nose becomes thicker (like pus) or has new blood in it.     · You are not getting better as expected. Where can you learn more? Go to [email or web address]://[email or web address] org and sign in to your Highlight account. Enter T400 in the Facishare box to learn more about \"Sinusitis: Care Instructions. \"     If you do not have an account, please click on the \"Sign Up Now\" link. Current as of: March 28, 2018  Content Version: 11.8  © 2624-5219 [employer], [employer]. Care instructions adapted under license by [facility] ([facility]). If you have questions about a medical condition or this instruction, always ask your healthcare professional. [address] any warranty or liability for your use of this information.

## 2019-02-14 ENCOUNTER — TELEPHONE (OUTPATIENT)
Dept: FAMILY MEDICINE CLINIC | Age: 39
End: 2019-02-14

## 2019-02-14 ENCOUNTER — OFFICE VISIT (OUTPATIENT)
Dept: INTERNAL MEDICINE CLINIC | Age: 39
End: 2019-02-14
Payer: COMMERCIAL

## 2019-02-14 VITALS
OXYGEN SATURATION: 100 % | DIASTOLIC BLOOD PRESSURE: 66 MMHG | SYSTOLIC BLOOD PRESSURE: 96 MMHG | HEIGHT: 69 IN | HEART RATE: 91 BPM | WEIGHT: 174 LBS | BODY MASS INDEX: 25.77 KG/M2

## 2019-02-14 DIAGNOSIS — S39.012A STRAIN OF LUMBAR REGION, INITIAL ENCOUNTER: Primary | ICD-10-CM

## 2019-02-14 DIAGNOSIS — M54.50 LUMBAR BACK PAIN: ICD-10-CM

## 2019-02-14 LAB
BILIRUBIN, POC: NORMAL
BLOOD URINE, POC: NORMAL
CLARITY, POC: CLEAR
COLOR, POC: YELLOW
GLUCOSE URINE, POC: NORMAL
KETONES, POC: NORMAL
LEUKOCYTE EST, POC: NORMAL
NITRITE, POC: NORMAL
PH, POC: 8
PROTEIN, POC: NORMAL
SPECIFIC GRAVITY, POC: 1
UROBILINOGEN, POC: NORMAL

## 2019-02-14 PROCEDURE — 99213 OFFICE O/P EST LOW 20 MIN: CPT | Performed by: NURSE PRACTITIONER

## 2019-02-14 PROCEDURE — 81002 URINALYSIS NONAUTO W/O SCOPE: CPT | Performed by: NURSE PRACTITIONER

## 2019-02-14 RX ORDER — MELOXICAM 15 MG/1
15 TABLET ORAL DAILY
Qty: 30 TABLET | Refills: 3 | Status: SHIPPED | OUTPATIENT
Start: 2019-02-14 | End: 2019-05-29 | Stop reason: CLARIF

## 2019-02-14 RX ORDER — BACLOFEN 10 MG/1
10 TABLET ORAL 3 TIMES DAILY
Qty: 21 TABLET | Refills: 0 | Status: SHIPPED | OUTPATIENT
Start: 2019-02-14 | End: 2019-05-29 | Stop reason: CLARIF

## 2019-02-14 RX ORDER — METHYLPREDNISOLONE 4 MG/1
TABLET ORAL
Qty: 1 KIT | Refills: 0 | Status: SHIPPED | OUTPATIENT
Start: 2019-02-14 | End: 2019-02-20

## 2019-02-14 ASSESSMENT — PATIENT HEALTH QUESTIONNAIRE - PHQ9
SUM OF ALL RESPONSES TO PHQ QUESTIONS 1-9: 0
SUM OF ALL RESPONSES TO PHQ QUESTIONS 1-9: 0
1. LITTLE INTEREST OR PLEASURE IN DOING THINGS: 0
SUM OF ALL RESPONSES TO PHQ9 QUESTIONS 1 & 2: 0
2. FEELING DOWN, DEPRESSED OR HOPELESS: 0

## 2019-02-14 ASSESSMENT — ENCOUNTER SYMPTOMS
EYE ITCHING: 0
EYE REDNESS: 0
ABDOMINAL PAIN: 0
BACK PAIN: 1
NAUSEA: 0
CONSTIPATION: 0
VOMITING: 0
CHEST TIGHTNESS: 0
COUGH: 0
COLOR CHANGE: 0
SINUS PRESSURE: 0
BLOOD IN STOOL: 0
DIARRHEA: 0
RHINORRHEA: 0
SHORTNESS OF BREATH: 0
WHEEZING: 0
SORE THROAT: 0

## 2019-02-16 LAB — URINE CULTURE, ROUTINE: NORMAL

## 2019-03-15 ENCOUNTER — TELEPHONE (OUTPATIENT)
Dept: FAMILY MEDICINE CLINIC | Age: 39
End: 2019-03-15

## 2019-05-29 ENCOUNTER — OFFICE VISIT (OUTPATIENT)
Dept: FAMILY MEDICINE CLINIC | Age: 39
End: 2019-05-29
Payer: COMMERCIAL

## 2019-05-29 VITALS
HEIGHT: 69 IN | TEMPERATURE: 98.7 F | HEART RATE: 95 BPM | DIASTOLIC BLOOD PRESSURE: 78 MMHG | WEIGHT: 171.3 LBS | BODY MASS INDEX: 25.37 KG/M2 | OXYGEN SATURATION: 98 % | SYSTOLIC BLOOD PRESSURE: 104 MMHG | RESPIRATION RATE: 16 BRPM

## 2019-05-29 DIAGNOSIS — R10.13 EPIGASTRIC PAIN: ICD-10-CM

## 2019-05-29 DIAGNOSIS — R82.998 LEUKOCYTES IN URINE: ICD-10-CM

## 2019-05-29 DIAGNOSIS — R10.11 RUQ ABDOMINAL PAIN: Primary | ICD-10-CM

## 2019-05-29 DIAGNOSIS — R10.11 RUQ ABDOMINAL PAIN: ICD-10-CM

## 2019-05-29 LAB
BACTERIA URINE, POC: ABNORMAL
BILIRUBIN URINE: 0 MG/DL
BLOOD, URINE: NEGATIVE
CASTS URINE, POC: ABNORMAL
CLARITY: ABNORMAL
COLOR: YELLOW
CONTROL: NORMAL
CRYSTALS URINE, POC: ABNORMAL
EPI CELLS URINE, POC: ABNORMAL
GLUCOSE URINE: ABNORMAL
KETONES, URINE: NEGATIVE
LEUKOCYTE EST, POC: ABNORMAL
NITRITE, URINE: NEGATIVE
PH UA: 7.5 (ref 4.5–8)
PREGNANCY TEST URINE, POC: NEGATIVE
PROTEIN UA: NEGATIVE
RBC URINE, POC: ABNORMAL
SPECIFIC GRAVITY UA: 1 (ref 1–1.03)
UROBILINOGEN, URINE: ABNORMAL
WBC URINE, POC: ABNORMAL
YEAST URINE, POC: ABNORMAL

## 2019-05-29 PROCEDURE — 81025 URINE PREGNANCY TEST: CPT | Performed by: FAMILY MEDICINE

## 2019-05-29 PROCEDURE — 81000 URINALYSIS NONAUTO W/SCOPE: CPT | Performed by: FAMILY MEDICINE

## 2019-05-29 PROCEDURE — 99214 OFFICE O/P EST MOD 30 MIN: CPT | Performed by: FAMILY MEDICINE

## 2019-05-29 RX ORDER — PANTOPRAZOLE SODIUM 20 MG/1
20 TABLET, DELAYED RELEASE ORAL DAILY
Qty: 30 TABLET | Refills: 0 | Status: SHIPPED | OUTPATIENT
Start: 2019-05-29 | End: 2020-04-15 | Stop reason: SDUPTHER

## 2019-05-29 ASSESSMENT — ENCOUNTER SYMPTOMS
DIARRHEA: 0
COUGH: 0
STRIDOR: 0
BACK PAIN: 0
ABDOMINAL PAIN: 1
CONSTIPATION: 0
WHEEZING: 0
SHORTNESS OF BREATH: 0
CHEST TIGHTNESS: 0
ABDOMINAL DISTENTION: 0
CHOKING: 0
BLOOD IN STOOL: 0
VOMITING: 0
RECTAL PAIN: 0
APNEA: 0
ANAL BLEEDING: 0
NAUSEA: 0

## 2019-05-29 ASSESSMENT — PATIENT HEALTH QUESTIONNAIRE - PHQ9
2. FEELING DOWN, DEPRESSED OR HOPELESS: 0
SUM OF ALL RESPONSES TO PHQ9 QUESTIONS 1 & 2: 0
1. LITTLE INTEREST OR PLEASURE IN DOING THINGS: 0
SUM OF ALL RESPONSES TO PHQ QUESTIONS 1-9: 0
SUM OF ALL RESPONSES TO PHQ QUESTIONS 1-9: 0

## 2019-05-29 NOTE — PROGRESS NOTES
uriSubjective:      Patient ID: Debbie Garcia is a 41 y.o. female. HPI    Presenting w/new complaint of mild-moderate RUQ-epigastric x 2weeks. Associated w/nothing identified. Worsened(aggravated)/triggered after eating food:no specific foods reported  Improves by itself within 3-4hrs. Denies other areas of abdo pain/n-v/appetite decrease or loss/melena/diarrhea/constipation/dysphagia/odynophagia. Allergies   Allergen Reactions    Sulfa Antibiotics      Pt states that her throat closes up        Current Outpatient Medications on File Prior to Visit   Medication Sig Dispense Refill    vitamin B-12 (CYANOCOBALAMIN) 500 MCG tablet Take 500 mcg by mouth daily      Multiple Vitamins-Minerals (WOMENS MULTI VITAMIN & MINERAL) TABS Take  by mouth.  FLUoxetine (PROZAC) 40 MG capsule Take 1 capsule by mouth daily 30 capsule 2     Current Facility-Administered Medications on File Prior to Visit   Medication Dose Route Frequency Provider Last Rate Last Dose    medroxyPROGESTERone (DEPO-PROVERA) injection 150 mg  150 mg Intramuscular Once Matt Deal MD        medroxyPROGESTERone (DEPO-PROVERA) injection 150 mg  150 mg Intramuscular Once Booker Burns MA           Past Medical History:   Diagnosis Date    Acute sinusitis 6/24/2013    Acute sinusitis 6/24/2013    Anxiety     ALEXSANDRA II (cervical intraepithelial neoplasia II) 4/21/09;10/2012    Dr. Matta(GYN):[facility]. Repeat pap advised 10/22/09:nml w/next screenings yrly advised.     Depression     Gastroesophageal reflux disease without esophagitis     Hiatal hernia:small 6/27/2017    HPV test positive 11/4/13    Repeat pap smear & HPV testing due 11/4/14    Hypercholesteremia     Insomnia 8/19/2015    LGSIL (low grade squamous intraepithelial lesion) on Pap smear 2/11/09    Low HDL (under 40) 10/9/2012    Lumbar back pain 2/14/2019    Migraine     Panic attack 9/18/2012    Pap smear 2007;11/30/2011;11/2013;11/25/15 11/25/15=nml. Next due 2014. 2007-prior abnml paps pt unsure of dx, 2009 LGSIL pap colposcopy 2009 with bx -CIN1-2, per  gyn Dr Jayant Bui observation with repeat pap sched 10/22/09,    Renal stone:left 2017    Ulcer aphthous oral 2010    Urinary incontinence 2017    Uterine leiomyoma 2017           Social History     Tobacco Use    Smoking status: Former Smoker     Packs/day: 0.50     Years: 10.00     Pack years: 5.00     Types: Cigarettes     Last attempt to quit: 2017     Years since quittin.9    Smokeless tobacco: Never Used   Substance Use Topics    Alcohol use: Yes     Comment: social    Drug use: No     Social History     Substance and Sexual Activity   Drug Use No             Review of Systems   Constitutional: Negative for activity change, appetite change, chills, diaphoresis, fatigue, fever and unexpected weight change. Respiratory: Negative for apnea, cough, choking, chest tightness, shortness of breath, wheezing and stridor. Cardiovascular: Negative for chest pain, palpitations and leg swelling. Gastrointestinal: Positive for abdominal pain. Negative for abdominal distention, anal bleeding, blood in stool, constipation, diarrhea, nausea, rectal pain and vomiting. Genitourinary: Negative for decreased urine volume, difficulty urinating, dyspareunia, dysuria, enuresis, flank pain, frequency, genital sores, hematuria, menstrual problem, pelvic pain, urgency, vaginal bleeding, vaginal discharge and vaginal pain. Musculoskeletal: Negative for back pain. Skin: Negative for rash. Neurological: Negative for dizziness and light-headedness. Objective:   Physical Exam   Constitutional: She is oriented to person, place, and time. Vital signs are normal. She appears well-developed and well-nourished. She is cooperative. She does not have a sickly appearance. No distress.    HENT:   Nose: Nose normal.   Mouth/Throat: Uvula is midline, oropharynx is clear and moist and mucous membranes are normal.   Hearing intact to nml conversation   Eyes: Pupils are equal, round, and reactive to light. Conjunctivae, EOM and lids are normal.   Neck: Trachea normal and normal range of motion. Neck supple. Cardiovascular: Normal rate, regular rhythm, normal heart sounds, intact distal pulses and normal pulses. Pulmonary/Chest: Effort normal and breath sounds normal.   CTAB,good AE bilaterally   Abdominal: Soft. Normal appearance and bowel sounds are normal. She exhibits no distension, no abdominal bruit and no mass. There is no splenomegaly or hepatomegaly. There is no tenderness. There is no rigidity, no rebound, no guarding and no CVA tenderness. Benign abdo exam.   Lymphadenopathy:     She has no cervical adenopathy. Neurological: She is alert and oriented to person, place, and time. Skin: Skin is warm, dry and intact. Capillary refill takes less than 2 seconds. No rash noted. Good skin turgor. Psychiatric: She has a normal mood and affect. Assessment:       Diagnosis Orders   1. RUQ abdominal pain  VSS/well appearing. ?gallstones:eval with labs & US. Trial of PPI:?epigastric sx may be GERD/gastritis. Possible med side effects reviewed. Pt' wishes to proceed w/med. POCT urine pregnancy:negative. CBC    Comprehensive Metabolic Panel    POC URINE with Microscopic:see chart. US ABDOMEN COMPLETE    LIPASE    pantoprazole (PROTONIX) 20 MG tablet   2. Epigastric pain  POCT urine pregnancy    CBC    Comprehensive Metabolic Panel    POC URINE with Microscopic    US ABDOMEN COMPLETE    LIPASE    pantoprazole (PROTONIX) 20 MG tablet     3. Leukocytes in urine  Urine Culture           Plan:      Call or return to clinic prn if these symptoms worsen or fail to improve as anticipated. Pt' left office in good condition. Obtain labs/diagnostic tests as discussed today & call back for results within 2-7days.       Advised to go to local ER or call 911 for any worrisome signs/sx including but not limited to worsening of current complaints.     Carolyn Lang MD

## 2019-05-30 LAB
A/G RATIO: 1.9 (ref 1.1–2.2)
ALBUMIN SERPL-MCNC: 4.7 G/DL (ref 3.4–5)
ALP BLD-CCNC: 66 U/L (ref 40–129)
ALT SERPL-CCNC: 13 U/L (ref 10–40)
ANION GAP SERPL CALCULATED.3IONS-SCNC: 14 MMOL/L (ref 3–16)
AST SERPL-CCNC: 13 U/L (ref 15–37)
BILIRUB SERPL-MCNC: 0.5 MG/DL (ref 0–1)
BUN BLDV-MCNC: 9 MG/DL (ref 7–20)
CALCIUM SERPL-MCNC: 9.9 MG/DL (ref 8.3–10.6)
CHLORIDE BLD-SCNC: 104 MMOL/L (ref 99–110)
CO2: 23 MMOL/L (ref 21–32)
CREAT SERPL-MCNC: 0.7 MG/DL (ref 0.6–1.1)
GFR AFRICAN AMERICAN: >60
GFR NON-AFRICAN AMERICAN: >60
GLOBULIN: 2.5 G/DL
GLUCOSE BLD-MCNC: 95 MG/DL (ref 70–99)
HCT VFR BLD CALC: 43.7 % (ref 36–48)
HEMOGLOBIN: 15.1 G/DL (ref 12–16)
LIPASE: 26 U/L (ref 13–60)
MCH RBC QN AUTO: 31.1 PG (ref 26–34)
MCHC RBC AUTO-ENTMCNC: 34.5 G/DL (ref 31–36)
MCV RBC AUTO: 90 FL (ref 80–100)
PDW BLD-RTO: 12.8 % (ref 12.4–15.4)
PLATELET # BLD: 369 K/UL (ref 135–450)
PMV BLD AUTO: 9 FL (ref 5–10.5)
POTASSIUM SERPL-SCNC: 4.5 MMOL/L (ref 3.5–5.1)
RBC # BLD: 4.85 M/UL (ref 4–5.2)
SODIUM BLD-SCNC: 141 MMOL/L (ref 136–145)
TOTAL PROTEIN: 7.2 G/DL (ref 6.4–8.2)
WBC # BLD: 6.5 K/UL (ref 4–11)

## 2019-05-31 ENCOUNTER — TELEPHONE (OUTPATIENT)
Dept: FAMILY MEDICINE CLINIC | Age: 39
End: 2019-05-31

## 2019-05-31 LAB — URINE CULTURE, ROUTINE: NORMAL

## 2019-05-31 RX ORDER — ONDANSETRON 4 MG/1
4 TABLET, FILM COATED ORAL EVERY 8 HOURS PRN
Qty: 21 TABLET | Refills: 1 | Status: SHIPPED | OUTPATIENT
Start: 2019-05-31 | End: 2019-06-07

## 2019-05-31 NOTE — TELEPHONE ENCOUNTER
Zofran e-scribed for n-v.  Keep US appt. If medication does not help or if sx worsen then go to local ER/urgent care over the weekend/out of office hrs.

## 2019-05-31 NOTE — TELEPHONE ENCOUNTER
[facility] [facility] says she is vomiting every time she eats. Had labs done on Wednesday. This has been going on for 2 weeks. She is scheduled to have an ultrasound on Tuesday. PT says she doesn't know if she can go 4 more days with this nausea. Wants to know it there are any other options available to her.     Last seen 5/29/2019

## 2019-06-04 ENCOUNTER — HOSPITAL ENCOUNTER (OUTPATIENT)
Dept: ULTRASOUND IMAGING | Age: 39
Discharge: HOME | End: 2019-06-04
Payer: COMMERCIAL

## 2019-06-04 DIAGNOSIS — R10.13 EPIGASTRIC PAIN: ICD-10-CM

## 2019-06-04 DIAGNOSIS — R10.11 RUQ ABDOMINAL PAIN: ICD-10-CM

## 2019-06-04 PROCEDURE — 76705 ECHO EXAM OF ABDOMEN: CPT

## 2019-06-05 ENCOUNTER — TELEPHONE (OUTPATIENT)
Dept: FAMILY MEDICINE CLINIC | Age: 39
End: 2019-06-05

## 2019-06-05 DIAGNOSIS — R10.11 RUQ ABDOMINAL PAIN: ICD-10-CM

## 2019-06-05 DIAGNOSIS — R10.13 EPIGASTRIC PAIN: Primary | ICD-10-CM

## 2019-06-05 DIAGNOSIS — R11.15 NON-INTRACTABLE CYCLICAL VOMITING WITH NAUSEA: ICD-10-CM

## 2019-06-05 NOTE — TELEPHONE ENCOUNTER
Spoke with pt. Pt states that she was calling to follow up on the results from yesterday. She would like to see Dr. Marvin Schultz to see what he wants to do for pt.   Ok to place referral?

## 2019-06-05 NOTE — TELEPHONE ENCOUNTER
Patient is calling requesting a call back about a phone call she had yesterday patient would not go into detail just wants to speak to medical staff  Adela Renee [phone]

## 2019-06-05 NOTE — TELEPHONE ENCOUNTER
[address]    PT is returning Radha's call. Please call again. She will have her cell phone on her desk.     Last seen 5/29/2019

## 2019-06-12 ENCOUNTER — PATIENT MESSAGE (OUTPATIENT)
Dept: FAMILY MEDICINE CLINIC | Age: 39
End: 2019-06-12

## 2019-06-12 DIAGNOSIS — R19.7 DIARRHEA, UNSPECIFIED TYPE: Primary | ICD-10-CM

## 2019-09-18 DIAGNOSIS — F41.9 ANXIETY: ICD-10-CM

## 2019-09-18 DIAGNOSIS — F33.1 MAJOR DEPRESSIVE DISORDER, RECURRENT EPISODE, MODERATE (HCC): ICD-10-CM

## 2019-09-18 DIAGNOSIS — F41.0 PANIC ATTACK: ICD-10-CM

## 2019-09-18 RX ORDER — FLUOXETINE HYDROCHLORIDE 40 MG/1
CAPSULE ORAL
Qty: 30 CAPSULE | Refills: 0 | Status: SHIPPED | OUTPATIENT
Start: 2019-09-18 | End: 2019-10-17 | Stop reason: SDUPTHER

## 2019-10-17 DIAGNOSIS — F41.9 ANXIETY: ICD-10-CM

## 2019-10-17 DIAGNOSIS — F41.0 PANIC ATTACK: ICD-10-CM

## 2019-10-17 DIAGNOSIS — F33.1 MAJOR DEPRESSIVE DISORDER, RECURRENT EPISODE, MODERATE (HCC): ICD-10-CM

## 2019-10-17 RX ORDER — FLUOXETINE HYDROCHLORIDE 40 MG/1
CAPSULE ORAL
Qty: 30 CAPSULE | Refills: 1 | Status: SHIPPED | OUTPATIENT
Start: 2019-10-17 | End: 2019-10-22 | Stop reason: SDUPTHER

## 2019-10-22 DIAGNOSIS — F33.1 MAJOR DEPRESSIVE DISORDER, RECURRENT EPISODE, MODERATE (HCC): ICD-10-CM

## 2019-10-22 DIAGNOSIS — F41.0 PANIC ATTACK: ICD-10-CM

## 2019-10-22 DIAGNOSIS — F41.9 ANXIETY: ICD-10-CM

## 2019-10-22 RX ORDER — FLUOXETINE HYDROCHLORIDE 40 MG/1
CAPSULE ORAL
Qty: 30 CAPSULE | Refills: 1 | Status: SHIPPED | OUTPATIENT
Start: 2019-10-22 | End: 2019-12-20 | Stop reason: SDUPTHER

## 2019-10-28 ENCOUNTER — TELEPHONE (OUTPATIENT)
Dept: FAMILY MEDICINE CLINIC | Age: 39
End: 2019-10-28

## 2019-10-28 DIAGNOSIS — K63.5 POLYP OF COLON, UNSPECIFIED PART OF COLON, UNSPECIFIED TYPE: Primary | ICD-10-CM

## 2019-11-25 ENCOUNTER — TELEPHONE (OUTPATIENT)
Dept: FAMILY MEDICINE CLINIC | Age: 39
End: 2019-11-25

## 2019-12-20 DIAGNOSIS — F33.1 MAJOR DEPRESSIVE DISORDER, RECURRENT EPISODE, MODERATE (HCC): ICD-10-CM

## 2019-12-20 DIAGNOSIS — F41.0 PANIC ATTACK: ICD-10-CM

## 2019-12-20 DIAGNOSIS — F41.9 ANXIETY: ICD-10-CM

## 2019-12-21 DIAGNOSIS — F33.1 MAJOR DEPRESSIVE DISORDER, RECURRENT EPISODE, MODERATE (HCC): ICD-10-CM

## 2019-12-21 DIAGNOSIS — F41.0 PANIC ATTACK: ICD-10-CM

## 2019-12-21 DIAGNOSIS — F41.9 ANXIETY: ICD-10-CM

## 2019-12-21 RX ORDER — FLUOXETINE HYDROCHLORIDE 40 MG/1
CAPSULE ORAL
Qty: 30 CAPSULE | Refills: 1 | Status: SHIPPED | OUTPATIENT
Start: 2019-12-21 | End: 2019-12-21 | Stop reason: SDUPTHER

## 2019-12-21 RX ORDER — FLUOXETINE HYDROCHLORIDE 40 MG/1
CAPSULE ORAL
Qty: 30 CAPSULE | Refills: 1 | Status: SHIPPED | OUTPATIENT
Start: 2019-12-21 | End: 2020-01-12 | Stop reason: SDUPTHER

## 2020-01-12 RX ORDER — FLUOXETINE HYDROCHLORIDE 40 MG/1
CAPSULE ORAL
Qty: 90 CAPSULE | Refills: 0 | Status: SHIPPED | OUTPATIENT
Start: 2020-01-12 | End: 2020-01-22

## 2020-01-22 RX ORDER — FLUOXETINE HYDROCHLORIDE 40 MG/1
CAPSULE ORAL
Qty: 30 CAPSULE | Refills: 1 | Status: SHIPPED | OUTPATIENT
Start: 2020-01-22 | End: 2020-01-23 | Stop reason: SDUPTHER

## 2020-01-23 RX ORDER — FLUOXETINE HYDROCHLORIDE 40 MG/1
CAPSULE ORAL
Qty: 90 CAPSULE | Refills: 0 | Status: SHIPPED | OUTPATIENT
Start: 2020-01-23 | End: 2020-04-15 | Stop reason: SDUPTHER

## 2020-04-14 ENCOUNTER — TELEPHONE (OUTPATIENT)
Dept: FAMILY MEDICINE CLINIC | Age: 40
End: 2020-04-14

## 2020-04-15 ENCOUNTER — VIRTUAL VISIT (OUTPATIENT)
Dept: FAMILY MEDICINE CLINIC | Age: 40
End: 2020-04-15
Payer: COMMERCIAL

## 2020-04-15 VITALS
RESPIRATION RATE: 16 BRPM | SYSTOLIC BLOOD PRESSURE: 112 MMHG | DIASTOLIC BLOOD PRESSURE: 78 MMHG | WEIGHT: 156 LBS | BODY MASS INDEX: 23.11 KG/M2 | TEMPERATURE: 98.3 F | HEIGHT: 69 IN | HEART RATE: 88 BPM

## 2020-04-15 PROCEDURE — 99214 OFFICE O/P EST MOD 30 MIN: CPT | Performed by: FAMILY MEDICINE

## 2020-04-15 RX ORDER — FLUOXETINE HYDROCHLORIDE 40 MG/1
CAPSULE ORAL
Qty: 90 CAPSULE | Refills: 0 | Status: SHIPPED | OUTPATIENT
Start: 2020-04-15 | End: 2020-10-09 | Stop reason: SDUPTHER

## 2020-04-15 RX ORDER — PANTOPRAZOLE SODIUM 20 MG/1
20 TABLET, DELAYED RELEASE ORAL DAILY
Qty: 30 TABLET | Refills: 0 | Status: SHIPPED | OUTPATIENT
Start: 2020-04-15 | End: 2020-05-07

## 2020-04-15 ASSESSMENT — ENCOUNTER SYMPTOMS
ANAL BLEEDING: 0
NAUSEA: 0
COUGH: 0
APNEA: 0
STRIDOR: 0
VOMITING: 0
CONSTIPATION: 0
CHEST TIGHTNESS: 0
SHORTNESS OF BREATH: 0
CHOKING: 0
WHEEZING: 0
ABDOMINAL DISTENTION: 0
BLOOD IN STOOL: 0
DIARRHEA: 0
ABDOMINAL PAIN: 0
RECTAL PAIN: 0

## 2020-05-07 RX ORDER — PANTOPRAZOLE SODIUM 20 MG/1
TABLET, DELAYED RELEASE ORAL
Qty: 30 TABLET | Refills: 0 | Status: SHIPPED | OUTPATIENT
Start: 2020-05-07 | End: 2022-01-24

## 2020-10-04 RX ORDER — FLUOXETINE HYDROCHLORIDE 40 MG/1
CAPSULE ORAL
Qty: 90 CAPSULE | Refills: 0 | OUTPATIENT
Start: 2020-10-04

## 2020-10-04 NOTE — TELEPHONE ENCOUNTER
Med refill request::is due for med check appt. Ok to schedule VV:med refill can be sent in during office visit.

## 2020-10-07 ENCOUNTER — TELEPHONE (OUTPATIENT)
Dept: FAMILY MEDICINE CLINIC | Age: 40
End: 2020-10-07

## 2020-10-07 NOTE — TELEPHONE ENCOUNTER
Patient refill request;    FLUoxetine (PROZAC) 40 MG capsule     Pharmacy:  [address], [place] - [address] [phone]     OV: 4/15/2020    Please advise.

## 2020-10-07 NOTE — TELEPHONE ENCOUNTER
Per message from 10/3: Shira Bar MD           8:56 AM   Note      Med refill request::is due for med check appt.   Ok to schedule VV:med refill can be sent in during office visit.

## 2020-10-09 ENCOUNTER — VIRTUAL VISIT (OUTPATIENT)
Dept: FAMILY MEDICINE CLINIC | Age: 40
End: 2020-10-09
Payer: COMMERCIAL

## 2020-10-09 PROCEDURE — 99443 PR PHYS/QHP TELEPHONE EVALUATION 21-30 MIN: CPT | Performed by: FAMILY MEDICINE

## 2020-10-09 RX ORDER — FLUOXETINE HYDROCHLORIDE 40 MG/1
CAPSULE ORAL
Qty: 90 CAPSULE | Refills: 0 | Status: SHIPPED | OUTPATIENT
Start: 2020-10-09 | End: 2021-01-05

## 2020-10-09 RX ORDER — FLUOXETINE HYDROCHLORIDE 20 MG/1
20 CAPSULE ORAL DAILY
Qty: 90 CAPSULE | Refills: 0 | Status: SHIPPED | OUTPATIENT
Start: 2020-10-09 | End: 2021-01-05

## 2020-10-09 ASSESSMENT — ENCOUNTER SYMPTOMS
SHORTNESS OF BREATH: 0
RECTAL PAIN: 0
APNEA: 0
WHEEZING: 0
ABDOMINAL DISTENTION: 0
DIARRHEA: 0
BLOOD IN STOOL: 0
COUGH: 0
CHEST TIGHTNESS: 0
STRIDOR: 0
ABDOMINAL PAIN: 0
ANAL BLEEDING: 0
VOMITING: 0
NAUSEA: 0
CONSTIPATION: 0
CHOKING: 0

## 2020-10-09 NOTE — PROGRESS NOTES
Subjective:      Patient ID: Keya Reagan is a 46 y.o. female. HPI   P't unable to use video visit today. Keya Reagan is a 46 y.o. female evaluated via telephone on 10/9/2020. Consent:  She and/or health care decision maker is aware that that she may receive a bill for this telephone service, depending on her insurance coverage, and has provided verbal consent to proceed:yes-Consent obtained within past 12 months:yes      Documentation:  I communicated with the patient and/or health care decision maker about depression & chronic med conditions:see below. Details of this discussion including any medical advice provided: yes      I affirm this is a Patient Initiated Episode with a Patient who has not had a related appointment within my department in the past 7 days or scheduled within the next 24 hours. Patient identification was verified at the start of the visit:yes. Total Time: 22 minutes. Juan Francisco Flores        Migraines:mild:reports doing well per baseline. No new associated concerns. Denies dizziness/syncope/presyncope/cp/sob/URI signs or sx/appetite loss or decrease/fever/rash/neck pain-stiffness/sick contacts/v/abdo pain/vision disturbances/seizures/neuro deficits/paresthesia/paralysis/speech disturbances. Depression(mild)/anxiety(mild) & panic attacks check up:takes medication w/o side effects. Doing well with prozac 40mg but some days is not well controlled with regards to depression/panic attacks & depression:wishes to increase med dose. Improves with medication on some days. Worsened by life stress. Sleeping good per baseline. Worsened by life stress. Denies suicidal ideations/homicidal ideations/dizziness/syncope/presyncope/HA/seizures/paresthesia/paralysis/other neuro deficits. GERD f/u: Taking medication without side effects. Doing well. No new associated/worsening or other improving factors.   Denies abdo pain/n-v/diarrhea/melena-blood in stool. Hypercholesterolemia:doing well. Labs overdue. Will have done soon. Follows good diet regime. Allergies   Allergen Reactions    Sulfa Antibiotics      Pt states that her throat closes up        Current Outpatient Medications on File Prior to Visit   Medication Sig Dispense Refill    pantoprazole (PROTONIX) 20 MG tablet TAKE 1 TABLET BY MOUTH EVERY DAY 30 tablet 0    FLUoxetine (PROZAC) 40 MG capsule TAKE 1 CAPSULE BY MOUTH EVERY DAY 90 capsule 0    vitamin B-12 (CYANOCOBALAMIN) 500 MCG tablet Take 500 mcg by mouth daily      Multiple Vitamins-Minerals (WOMENS MULTI VITAMIN & MINERAL) TABS Take  by mouth. Current Facility-Administered Medications on File Prior to Visit   Medication Dose Route Frequency Provider Last Rate Last Dose    medroxyPROGESTERone (DEPO-PROVERA) injection 150 mg  150 mg Intramuscular Once Bahman Porras MD        medroxyPROGESTERone (DEPO-PROVERA) injection 150 mg  150 mg Intramuscular Once Ramos Eldridge MA           Past Medical History:   Diagnosis Date    Acute sinusitis 6/24/2013    Acute sinusitis 6/24/2013    Anxiety     ALEXSANDRA II (cervical intraepithelial neoplasia II) 4/21/09;10/2012    Dr. Matta([facility]N):[facility]. Repeat pap advised 10/22/09:nml w/next screenings yrly advised.  Depression     Gastroesophageal reflux disease without esophagitis     Hiatal hernia:small 6/27/2017    HPV test positive 11/4/13    Repeat pap smear & HPV testing due 11/4/14    Hypercholesteremia     Insomnia 8/19/2015    LGSIL (low grade squamous intraepithelial lesion) on Pap smear 2/11/09    Low HDL (under 40) 10/9/2012    Lumbar back pain 2/14/2019    Migraine     Panic attack 9/18/2012    Pap smear 2007;11/30/2011;11/2013;11/25/15;3/2020    11/25/15=nml.  Next due 11/2014. 2007-prior abnml paps pt unsure of dx, 2/11/2009 LGSIL pap colposcopy 4/21/2009 with bx -CIN1-2, per  gyn Dr Grey Pak observation with repeat pap sched 10/22/09,  Renal stone:left 6/27/2017    Ulcer aphthous oral 12/28/2010    Urinary incontinence 9/28/2017    Uterine leiomyoma 9/28/2017       No past surgical history on file. Social History     Tobacco Use    Smoking status: Former Smoker     Packs/day: 0.50     Years: 10.00     Pack years: 5.00     Types: Cigarettes     Last attempt to quit: 7/1/2017     Years since quitting: 3.2    Smokeless tobacco: Never Used   Substance Use Topics    Alcohol use: Yes     Comment: social    Drug use: No     Social History     Substance and Sexual Activity   Drug Use No       Family History   Problem Relation Age of Onset    Asthma Mother     High Blood Pressure Paternal Aunt     High Cholesterol Paternal Uncle     Cancer Maternal Grandmother          Review of Systems   Constitutional: Negative for activity change, appetite change, chills, diaphoresis, fatigue, fever and unexpected weight change. Eyes: Negative for visual disturbance. Respiratory: Negative for apnea, cough, choking, chest tightness, shortness of breath, wheezing and stridor. Cardiovascular: Negative for chest pain, palpitations and leg swelling. Gastrointestinal: Negative for abdominal distention, abdominal pain, anal bleeding, blood in stool, constipation, diarrhea, nausea, rectal pain and vomiting. Endocrine: Negative. Skin: Negative for pallor, rash and wound. Neurological: Negative for dizziness, tremors, seizures, syncope, facial asymmetry, speech difficulty, weakness, light-headedness and numbness. Hematological: Negative for adenopathy. Psychiatric/Behavioral: Negative for agitation, behavioral problems, confusion, decreased concentration, hallucinations, self-injury, sleep disturbance and suicidal ideas. The patient is nervous/anxious. The patient is not hyperactive. Objective:   Physical Exam  Constitutional:       Comments: No audible acute distress during phone visit.      Pulmonary:      Comments: No audible wheezing/cough/sob. Psychiatric:         Speech: Speech normal.         Behavior: Behavior is cooperative. Thought Content: Thought content is not paranoid or delusional. Thought content does not include homicidal or suicidal ideation. Comments: Mild anxiety noted. Assessment:        Diagnosis Orders   1. Major depression(HCC)  Not well controlled depression/anxiety/panic attacks. Increase med to 60mg. Therapist advised. Possible med side effects reviewed including SI(suicidal ideations)/HI(homicidal ideations) Pt' wishes to proceed w/med dose increase. Recheck in 1mo or sooner if needed. FLUoxetine (PROZAC) 40 MG capsule   2. Gastroesophageal reflux disease without esophagitis  Stable. 3. Chronic migraine without aura without status migrainosus, not intractable  Stable. 4. Anxiety  See note#1. FLUoxetine (PROZAC) 40 MG capsule   5. Panic attack  See note#1. FLUoxetine (PROZAC) 40 MG capsule   6. Hypercholesteremia  Labs due. Plan:          Obtain labs/diagnostic tests as discussed today & call back for results within 2-7days. Call or return to clinic prn if these symptoms worsen or fail to improve as anticipated. Advised to go to local ER or call 911 for any worrisome signs/sx including but not limited to worsening of current complaint or development of SI/HI. Pt' ended call in good condition.       Aisha An MD

## 2020-12-28 ENCOUNTER — VIRTUAL VISIT (OUTPATIENT)
Dept: FAMILY MEDICINE CLINIC | Age: 40
End: 2020-12-28
Payer: COMMERCIAL

## 2020-12-28 ENCOUNTER — TELEPHONE (OUTPATIENT)
Dept: FAMILY MEDICINE CLINIC | Age: 40
End: 2020-12-28

## 2020-12-28 PROCEDURE — 99214 OFFICE O/P EST MOD 30 MIN: CPT | Performed by: FAMILY MEDICINE

## 2020-12-28 RX ORDER — AZITHROMYCIN 250 MG/1
250 TABLET, FILM COATED ORAL DAILY
Qty: 6 TABLET | Refills: 0 | Status: SHIPPED | OUTPATIENT
Start: 2020-12-28 | End: 2021-01-02

## 2020-12-28 RX ORDER — FLUTICASONE PROPIONATE 50 MCG
1 SPRAY, SUSPENSION (ML) NASAL DAILY
Qty: 1 BOTTLE | Refills: 1 | Status: SHIPPED | OUTPATIENT
Start: 2020-12-28 | End: 2021-01-20

## 2020-12-28 ASSESSMENT — ENCOUNTER SYMPTOMS
WHEEZING: 0
APNEA: 0
RHINORRHEA: 1
ABDOMINAL PAIN: 0
NAUSEA: 0
COUGH: 1
SINUS PAIN: 1
CHOKING: 0
EYE REDNESS: 0
ABDOMINAL DISTENTION: 0
RECTAL PAIN: 0
STRIDOR: 0
PHOTOPHOBIA: 0
FACIAL SWELLING: 0
SORE THROAT: 0
DIARRHEA: 0
EYE DISCHARGE: 0
ANAL BLEEDING: 0
SINUS PRESSURE: 1
TROUBLE SWALLOWING: 0
CONSTIPATION: 0
EYE PAIN: 0
EYE ITCHING: 0
SHORTNESS OF BREATH: 0
BLOOD IN STOOL: 0
VOICE CHANGE: 0
CHEST TIGHTNESS: 0
VOMITING: 0

## 2020-12-28 NOTE — TELEPHONE ENCOUNTER
Patient is requesting an appt for severe hot flashes, pain in R ear, congeation, nausea. Please advise.      OV: 10/9/2020  CB: [phone]

## 2020-12-28 NOTE — PROGRESS NOTES
Subjective:      Patient ID: Deidre Bhakta is a 45 y.o. female. HPI  Patient is  being evaluated by a Virtual Visit (video visit) encounter to address concerns as mentioned above. A caregiver was present when appropriate. Due to this being a TeleHealth encounter (During [facility]-14 public health emergency), evaluation of the following organ systems was limited: Vitals/Constitutional/EENT/Resp/CV/GI//MS/Neuro/Skin/Heme-Lymph-Imm. Pursuant to the emergency declaration under the [address] [place] [address], [address] [place] [address] authority and the [employer] Act, this Virtual Visit was conducted with patient's (and/or legal guardian's) consent, to reduce the patient's risk of exposure to COVID-19 and provide necessary medical care. The patient (and/or legal guardian) has also been advised to contact this office for worsening conditions or problems, and seek emergency medical treatment and/or call 911 if deemed necessary. Services were provided through a video synchronous discussion virtually to substitute for in-person clinic visit. Patient and provider were located at their individual homes. \"THIS VISIT WAS COMPLETED VIRTUALLY USING DO[employer]. ME\"  New problem:  Presenting w/mild to moderate sinus pressure x 4-5days. Associated w/clear nasal drainage/right ear fullness/subjective fever/mild body aches/change in taste. No loss of smell. Improves w/motrin short term. Worsens sinus pressure w/leaning forward. Sick contacts:none recalled. No COVID-19 exposure: tested negative. No fever reducing meds today. No loss of taste or smell. Denies neuro deficits/rash/neck pain-stiffness-swelling/photophobia/myalgias/dizziness. Denies rash/syncope/pre-syncope/HA.     Allergies   Allergen Reactions    Sulfa Antibiotics      Pt states that her throat closes up        Current Outpatient Medications on File Prior to Visit Medication Sig Dispense Refill    FLUoxetine (PROZAC) 40 MG capsule Take 1 capsule PO with 20mg for total of 60mg daily. 90 capsule 0    FLUoxetine (PROZAC) 20 MG capsule Take 1 capsule by mouth daily Take 1 capsule with 40mg for total of 60mg daily. 90 capsule 0    pantoprazole (PROTONIX) 20 MG tablet TAKE 1 TABLET BY MOUTH EVERY DAY 30 tablet 0    vitamin B-12 (CYANOCOBALAMIN) 500 MCG tablet Take 500 mcg by mouth daily      Multiple Vitamins-Minerals (WOMENS MULTI VITAMIN & MINERAL) TABS Take  by mouth. Current Facility-Administered Medications on File Prior to Visit   Medication Dose Route Frequency Provider Last Rate Last Admin    medroxyPROGESTERone (DEPO-PROVERA) injection 150 mg  150 mg Intramuscular Once Paresh Vo MD        medroxyPROGESTERone (DEPO-PROVERA) injection 150 mg  150 mg Intramuscular Once Jim Groves MA           Past Medical History:   Diagnosis Date    Acute sinusitis 6/24/2013    Acute sinusitis 6/24/2013    Anxiety     ALEXSANDRA II (cervical intraepithelial neoplasia II) 4/21/09;10/2012    Dr. Matta(GYN):[facility]. Repeat pap advised 10/22/09:nml w/next screenings yrly advised.  Depression     Gastroesophageal reflux disease without esophagitis     Hiatal hernia:small 6/27/2017    HPV test positive 11/4/13    Repeat pap smear & HPV testing due 11/4/14    Hypercholesteremia     Insomnia 8/19/2015    LGSIL (low grade squamous intraepithelial lesion) on Pap smear 2/11/09    Low HDL (under 40) 10/9/2012    Lumbar back pain 2/14/2019    Migraine     Panic attack 9/18/2012    Pap smear 2007;11/30/2011;11/2013;11/25/15;3/2020    11/25/15=nml.  Next due 11/2014. 2007-prior abnml paps pt unsure of dx, 2/11/2009 LGSIL pap colposcopy 4/21/2009 with bx -CIN1-2, per  gyn Dr Efrain Bower observation with repeat pap sched 10/22/09,    Renal stone:left 6/27/2017    Ulcer aphthous oral 12/28/2010    Urinary incontinence 9/28/2017    Uterine leiomyoma 9/28/2017 Social History     Tobacco Use    Smoking status: Former Smoker     Packs/day: 0.50     Years: 10.00     Pack years: 5.00     Types: Cigarettes     Quit date: 7/1/2017     Years since quitting: 3.4    Smokeless tobacco: Never Used   Substance Use Topics    Alcohol use: Yes     Comment: social    Drug use: No     Social History     Substance and Sexual Activity   Drug Use No           Review of Systems   Constitutional: Positive for fever. Negative for activity change, appetite change, chills, diaphoresis, fatigue and unexpected weight change. HENT: Positive for congestion, postnasal drip, rhinorrhea, sinus pressure, sinus pain and sneezing. Negative for dental problem, drooling, ear discharge, ear pain, facial swelling, hearing loss, mouth sores, nosebleeds, sore throat, tinnitus, trouble swallowing and voice change. Eyes: Negative for photophobia, pain, discharge, redness, itching and visual disturbance. Respiratory: Positive for cough. Negative for apnea, choking, chest tightness, shortness of breath, wheezing and stridor. Cardiovascular: Negative for chest pain, palpitations and leg swelling. Gastrointestinal: Negative for abdominal distention, abdominal pain, anal bleeding, blood in stool, constipation, diarrhea, nausea, rectal pain and vomiting. Musculoskeletal: Positive for myalgias. Negative for neck pain and neck stiffness. Skin: Negative for pallor, rash and wound. Neurological: Negative. Negative for dizziness and light-headedness. Hematological: Negative for adenopathy. Objective:RR=16. Physical Exam  Constitutional:       Appearance: She is well-developed. She is not toxic-appearing. Comments: Note:exam was conducted with pt' either self-palpating or visually indicating via their device camera. HENT:      Mouth/Throat:      Mouth: Mucous membranes are moist.      Pharynx: Uvula midline. Comments: Moist oropharynx/trace pharyngeal erythema. Mild erythematous nasal turbinates. Eyes:      General: No scleral icterus. Conjunctiva/sclera: Conjunctivae normal.      Pupils: Pupils are equal, round, and reactive to light. Neck:      Comments: No neck LAD per self-palpation. Pulmonary:      Effort: Pulmonary effort is normal.      Breath sounds: Normal breath sounds. Abdominal:      General: Bowel sounds are normal. There is no distension. Palpations: Abdomen is soft. Tenderness: There is no abdominal tenderness. Neurological:      Mental Status: She is alert. Psychiatric:         Behavior: Behavior is cooperative. Assessment:       Diagnosis Orders   1. Acute non-recurrent maxillary sinusitis  VSS per limited vitals obtainable via virtual visit(VV)/well appearing. Possible med side effects reviewed. Pt' wishes to proceed w/meds. Supportive tx & abx:Warm compresses/steam bowl inhalation/OTC tylenol (max daily=3g)anthistamine prn. fluticasone (FLONASE) 50 MCG/ACT nasal spray    azithromycin (ZITHROMAX Z-JODY) 250 MG tablet           Plan:       COVID-19 testing advised:urgent care or [facility] test site. Quarantine until results known. At this time is viral URI but if prolonged or worsening sx or development of yellow-green nasal drainage then ok to start abx. otc advil/tylenol/rest/fluids. Pt' ended call in good condition. Call or return to clinic prn if these symptoms worsen or fail to improve as anticipated. Advised to go to local ER or call 911 for any worrisome signs/sx including but not limited to worsening of current complaint or development of resp distress/dysphagia/odynophagia/sob/dizziness/appetite loss/high fever/rash.         Iain Armstrong MD

## 2020-12-28 NOTE — TELEPHONE ENCOUNTER
Advise to go to local urgent care or ER today to eval & tx this. F/u with me via VV after discharge from that facility.

## 2021-01-05 DIAGNOSIS — F33.1 MAJOR DEPRESSIVE DISORDER, RECURRENT EPISODE, MODERATE (HCC): ICD-10-CM

## 2021-01-05 DIAGNOSIS — F41.0 PANIC ATTACK: ICD-10-CM

## 2021-01-05 DIAGNOSIS — F41.9 ANXIETY: ICD-10-CM

## 2021-01-05 RX ORDER — FLUOXETINE HYDROCHLORIDE 20 MG/1
CAPSULE ORAL
Qty: 90 CAPSULE | Refills: 0 | Status: SHIPPED | OUTPATIENT
Start: 2021-01-05 | End: 2021-06-14 | Stop reason: SDUPTHER

## 2021-01-05 RX ORDER — FLUOXETINE HYDROCHLORIDE 40 MG/1
CAPSULE ORAL
Qty: 90 CAPSULE | Refills: 0 | Status: SHIPPED | OUTPATIENT
Start: 2021-01-05 | End: 2021-04-05

## 2021-01-19 DIAGNOSIS — J01.00 ACUTE NON-RECURRENT MAXILLARY SINUSITIS: ICD-10-CM

## 2021-01-20 RX ORDER — FLUTICASONE PROPIONATE 50 MCG
SPRAY, SUSPENSION (ML) NASAL
Qty: 1 BOTTLE | Refills: 1 | Status: SHIPPED | OUTPATIENT
Start: 2021-01-20 | End: 2021-02-15

## 2021-02-13 DIAGNOSIS — J01.00 ACUTE NON-RECURRENT MAXILLARY SINUSITIS: ICD-10-CM

## 2021-02-15 RX ORDER — FLUTICASONE PROPIONATE 50 MCG
SPRAY, SUSPENSION (ML) NASAL
Qty: 1 BOTTLE | Refills: 1 | Status: SHIPPED | OUTPATIENT
Start: 2021-02-15 | End: 2021-03-22

## 2021-03-20 DIAGNOSIS — J01.00 ACUTE NON-RECURRENT MAXILLARY SINUSITIS: ICD-10-CM

## 2021-03-22 RX ORDER — FLUTICASONE PROPIONATE 50 MCG
SPRAY, SUSPENSION (ML) NASAL
Qty: 1 BOTTLE | Refills: 1 | Status: SHIPPED | OUTPATIENT
Start: 2021-03-22 | End: 2022-01-24

## 2021-04-03 DIAGNOSIS — F41.0 PANIC ATTACK: ICD-10-CM

## 2021-04-03 DIAGNOSIS — F41.9 ANXIETY: ICD-10-CM

## 2021-04-03 DIAGNOSIS — F33.1 MAJOR DEPRESSIVE DISORDER, RECURRENT EPISODE, MODERATE (HCC): ICD-10-CM

## 2021-04-05 RX ORDER — FLUOXETINE HYDROCHLORIDE 40 MG/1
CAPSULE ORAL
Qty: 90 CAPSULE | Refills: 0 | Status: SHIPPED | OUTPATIENT
Start: 2021-04-05 | End: 2021-04-14 | Stop reason: SDUPTHER

## 2021-04-14 ENCOUNTER — TELEPHONE (OUTPATIENT)
Dept: FAMILY MEDICINE CLINIC | Age: 41
End: 2021-04-14

## 2021-04-14 DIAGNOSIS — F41.9 ANXIETY: ICD-10-CM

## 2021-04-14 DIAGNOSIS — F41.0 PANIC ATTACK: ICD-10-CM

## 2021-04-14 DIAGNOSIS — F33.1 MAJOR DEPRESSIVE DISORDER, RECURRENT EPISODE, MODERATE (HCC): ICD-10-CM

## 2021-04-14 RX ORDER — FLUOXETINE HYDROCHLORIDE 40 MG/1
CAPSULE ORAL
Qty: 30 CAPSULE | Refills: 0 | Status: SHIPPED | OUTPATIENT
Start: 2021-04-14 | End: 2021-06-14 | Stop reason: SDUPTHER

## 2021-04-14 NOTE — TELEPHONE ENCOUNTER
Patient also needs refill on the 20 MG:  FLUoxetine (PROZAC) 20 MG capsule    [facility]/pharmacy #[id number]- [address] [facility] [facility] [place]. - P [phone] - F [phone]   [address] [facility] [id number]   Phone:  [phone]  Fax:  [phone]    OV: 12/28/2020    Please advise

## 2021-06-14 ENCOUNTER — TELEPHONE (OUTPATIENT)
Dept: FAMILY MEDICINE CLINIC | Age: 41
End: 2021-06-14

## 2021-06-14 DIAGNOSIS — F41.9 ANXIETY: ICD-10-CM

## 2021-06-14 DIAGNOSIS — F41.0 PANIC ATTACK: ICD-10-CM

## 2021-06-14 DIAGNOSIS — F33.1 MAJOR DEPRESSIVE DISORDER, RECURRENT EPISODE, MODERATE (HCC): ICD-10-CM

## 2021-06-14 RX ORDER — FLUOXETINE HYDROCHLORIDE 40 MG/1
CAPSULE ORAL
Qty: 90 CAPSULE | Refills: 0 | Status: SHIPPED | OUTPATIENT
Start: 2021-06-14 | End: 2021-07-09

## 2021-06-14 RX ORDER — FLUOXETINE HYDROCHLORIDE 20 MG/1
CAPSULE ORAL
Qty: 90 CAPSULE | Refills: 0 | Status: SHIPPED | OUTPATIENT
Start: 2021-06-14 | End: 2021-09-07

## 2021-06-14 NOTE — TELEPHONE ENCOUNTER
----- Message from Chencho Resendez sent at 6/14/2021 11:13 AM EDT -----  Subject: Refill Request    QUESTIONS  Name of Medication? FLUoxetine (PROZAC) 40 MG capsule  Patient-reported dosage and instructions? 40 mg once a day  How many days do you have left? 0  Preferred Pharmacy? [facility]/PHARMACY #[id number]  Pharmacy phone number (if available)? [phone]  Additional Information for Provider? Pt takes one 40mg pill and one 20 mg   pill daily for a total of 60 mg  ---------------------------------------------------------------------------  --------------,  Name of Medication? FLUoxetine (PROZAC) 20 MG capsule  Patient-reported dosage and instructions? 20mg once a day  How many days do you have left? 0  Preferred Pharmacy? [facility]/PHARMACY #[id number]  Pharmacy phone number (if available)? [phone]  Additional Information for Provider? Pt takes one 40mg pill and one 20 mg   pill daily for a total of 60 mg  ---------------------------------------------------------------------------  --------------  CALL BACK INFO  What is the best way for the office to contact you? OK to leave message on   voicemail  Preferred Call Back Phone Number?  [phone]

## 2021-06-14 NOTE — TELEPHONE ENCOUNTER
rx transmitted electronically to the pharmacy via [employer]   Let patient know and verify pharmacy

## 2021-06-14 NOTE — TELEPHONE ENCOUNTER
Medication:   Requested Prescriptions     Pending Prescriptions Disp Refills    FLUoxetine (PROZAC) 40 MG capsule 90 capsule 0     Sig: TAKE 1 CAPSULE BY MOUTH WITH 20MG FOR TOTAL OF 60MG DAILY.  FLUoxetine (PROZAC) 20 MG capsule 90 capsule 0        Patient Phone Number: [phone] (home)     Last appt: 12/28/2020   Next appt: Visit date not found    Last OARRS: No flowsheet data found.

## 2021-07-09 DIAGNOSIS — F41.0 PANIC ATTACK: ICD-10-CM

## 2021-07-09 DIAGNOSIS — F33.1 MAJOR DEPRESSIVE DISORDER, RECURRENT EPISODE, MODERATE (HCC): ICD-10-CM

## 2021-07-09 DIAGNOSIS — F41.9 ANXIETY: ICD-10-CM

## 2021-07-09 RX ORDER — FLUOXETINE HYDROCHLORIDE 40 MG/1
CAPSULE ORAL
Qty: 90 CAPSULE | Refills: 0 | Status: SHIPPED | OUTPATIENT
Start: 2021-07-09 | End: 2021-12-15

## 2021-07-09 NOTE — TELEPHONE ENCOUNTER
Medication:   Requested Prescriptions     Pending Prescriptions Disp Refills    FLUoxetine (PROZAC) 40 MG capsule [Pharmacy Med Name: FLUOXETINE HCL 40 MG CAPSULE] 90 capsule 0     Sig: TAKE 1 CAPSULE BY MOUTH WITH 20MG FOR TOTAL OF 60MG DAILY.             Patient Phone Number: [phone] (home)     Last appt: 12/28/2020

## 2021-09-07 RX ORDER — FLUOXETINE HYDROCHLORIDE 20 MG/1
CAPSULE ORAL
Qty: 90 CAPSULE | Refills: 0 | Status: SHIPPED | OUTPATIENT
Start: 2021-09-07 | End: 2022-01-24

## 2021-09-07 NOTE — TELEPHONE ENCOUNTER
Medication:   Requested Prescriptions     Pending Prescriptions Disp Refills    FLUoxetine (PROZAC) 20 MG capsule [Pharmacy Med Name: FLUOXETINE HCL 20 MG CAPSULE] 90 capsule 0     Sig: TAKE 1 CAPSULE BY MOUTH EVERY DAY        Last Filled:      Patient Phone Number: [phone] (home)     Last appt: 12/28/2020   Next appt: Visit date not found    Last OARRS: No flowsheet data found.

## 2021-12-15 DIAGNOSIS — F33.1 MAJOR DEPRESSIVE DISORDER, RECURRENT EPISODE, MODERATE (HCC): ICD-10-CM

## 2021-12-15 DIAGNOSIS — F41.0 PANIC ATTACK: ICD-10-CM

## 2021-12-15 DIAGNOSIS — F41.9 ANXIETY: ICD-10-CM

## 2021-12-15 RX ORDER — FLUOXETINE HYDROCHLORIDE 40 MG/1
CAPSULE ORAL
Qty: 30 CAPSULE | Refills: 0 | Status: SHIPPED | OUTPATIENT
Start: 2021-12-15 | End: 2022-01-24

## 2021-12-15 NOTE — TELEPHONE ENCOUNTER
Medication:   Requested Prescriptions     Pending Prescriptions Disp Refills    FLUoxetine (PROZAC) 40 MG capsule [Pharmacy Med Name: FLUOXETINE HCL 40 MG CAPSULE] 90 capsule 0     Sig: TAKE 1 CAPSULE BY MOUTH WITH 20MG FOR TOTAL OF 60MG DAILY.             Patient Phone Number: [phone] (home)     Last appt: 12/28/2020

## 2022-01-24 ENCOUNTER — OFFICE VISIT (OUTPATIENT)
Dept: FAMILY MEDICINE CLINIC | Age: 42
End: 2022-01-24
Payer: COMMERCIAL

## 2022-01-24 VITALS
OXYGEN SATURATION: 98 % | HEIGHT: 69 IN | HEART RATE: 91 BPM | WEIGHT: 178.8 LBS | SYSTOLIC BLOOD PRESSURE: 118 MMHG | BODY MASS INDEX: 26.48 KG/M2 | DIASTOLIC BLOOD PRESSURE: 86 MMHG | TEMPERATURE: 98.6 F

## 2022-01-24 DIAGNOSIS — Z76.89 ENCOUNTER TO ESTABLISH CARE WITH NEW DOCTOR: Primary | ICD-10-CM

## 2022-01-24 DIAGNOSIS — F41.8 MIXED ANXIETY AND DEPRESSIVE DISORDER: ICD-10-CM

## 2022-01-24 DIAGNOSIS — F42.2 MIXED OBSESSIONAL THOUGHTS AND ACTS: ICD-10-CM

## 2022-01-24 PROCEDURE — 99204 OFFICE O/P NEW MOD 45 MIN: CPT | Performed by: FAMILY MEDICINE

## 2022-01-24 PROCEDURE — G8427 DOCREV CUR MEDS BY ELIG CLIN: HCPCS | Performed by: FAMILY MEDICINE

## 2022-01-24 PROCEDURE — G8484 FLU IMMUNIZE NO ADMIN: HCPCS | Performed by: FAMILY MEDICINE

## 2022-01-24 PROCEDURE — G8419 CALC BMI OUT NRM PARAM NOF/U: HCPCS | Performed by: FAMILY MEDICINE

## 2022-01-24 PROCEDURE — 4004F PT TOBACCO SCREEN RCVD TLK: CPT | Performed by: FAMILY MEDICINE

## 2022-01-24 RX ORDER — FLUVOXAMINE MALEATE 50 MG/1
50 TABLET, COATED ORAL NIGHTLY
Qty: 30 TABLET | Refills: 1 | Status: SHIPPED | OUTPATIENT
Start: 2022-01-24 | End: 2022-02-17 | Stop reason: DRUGHIGH

## 2022-01-24 RX ORDER — FLUOXETINE HYDROCHLORIDE 20 MG/1
CAPSULE ORAL
Qty: 90 CAPSULE | Refills: 0 | Status: CANCELLED | OUTPATIENT
Start: 2022-01-24

## 2022-01-24 RX ORDER — FLUOXETINE HYDROCHLORIDE 40 MG/1
CAPSULE ORAL
Qty: 90 CAPSULE | Refills: 0 | Status: CANCELLED | OUTPATIENT
Start: 2022-01-24

## 2022-01-24 ASSESSMENT — ENCOUNTER SYMPTOMS
DIARRHEA: 0
SHORTNESS OF BREATH: 0
ABDOMINAL DISTENTION: 0
VOMITING: 0
SINUS PRESSURE: 0
CONSTIPATION: 0
CHEST TIGHTNESS: 0
WHEEZING: 0
BACK PAIN: 0
TROUBLE SWALLOWING: 0
ABDOMINAL PAIN: 0
RHINORRHEA: 0
NAUSEA: 0
COUGH: 0
BLOOD IN STOOL: 0

## 2022-01-24 ASSESSMENT — PATIENT HEALTH QUESTIONNAIRE - PHQ9
SUM OF ALL RESPONSES TO PHQ QUESTIONS 1-9: 5
SUM OF ALL RESPONSES TO PHQ9 QUESTIONS 1 & 2: 5
SUM OF ALL RESPONSES TO PHQ QUESTIONS 1-9: 5
2. FEELING DOWN, DEPRESSED OR HOPELESS: 3
1. LITTLE INTEREST OR PLEASURE IN DOING THINGS: 2

## 2022-01-24 NOTE — PROGRESS NOTES
Scott Zamora   37 y.o. female   1980    This is patient's first visit with me. Kenn Sandoval is here to establish care as their new PCP. Kenn Sandoval has a PMH significant for:    Patient Active Problem List   Diagnosis    Depression with anxiety    Tobacco use disorder    Ulcer aphthous oral    Panic attack    Low HDL (under 40)    Heat intolerance    Burn:left forearm    Toe pain, left:4th    Corn of toe:L-4th    Acute sinusitis    ALEXSANDRA II (cervical intraepithelial neoplasia II) history    HPV test positive    Insomnia    Acute bronchitis    Lung nodule:RML:2-3mm    Gastroesophageal reflux disease without esophagitis    Hiatal hernia:small    Renal stone:left    Pelvic pain in female    Urinary incontinence    Generalized abdominal pain    Mass of colon:right    Uterine leiomyoma    Liver lesion    Lumbar back pain       Reason(s) for visit:   Chief Complaint   Patient presents with    Established New Doctor    Anxiety    Depression     OCD. Pt reported being out of Prozac for 1mo.  Medication Refill    Manic Behavior     Pt reported having a \"manic episode\" last night.  Discuss Medications    Insomnia       HPI:      Office visit encounter: This patient made a same-day appointment approximately 2 hours before the appointment time. No reason for visit designated by scheduling. She was accompanied by her  today. Patient reported of a manic episode that started yesterday and now feels back to normal.  This is what prompted to schedule an appointment as soon as possible. She stated that this recent episode of sari was partly triggered by her  who remarked about how many projects (around 25) that she has undertaken. For a living, patient works on arts and crafts. She reported of past history of bipolar manic episodes.   She stated that she has had issues with anxiety/depression since as far back as 6 years old or even younger. She reported of having issues of OCD such as cleaning - has to take \"everything off\" and \"clean every single piece\". It has to be a certain place. She has tried and learned to \"tune things out\", but it still bothers her and causes increased anxiety. 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 She stated that there's never been to the point that she wanted to hurt herself, but has had thoughts of what it would be like if she was not present. Meds tried: Sertraline, fluoxetine, escitalopram (made her very tired), paroxetine, etc.  I named different antipsychotics and mood stabilizers and it sounds like she's not bee on any. Of note, patient has been most recently taking fluoxetine was recently increased to 60 mg daily. She noted that she has been out of this medication for about a month now because her former PCP had to retire abruptly due to medical reasons. PDMP monitoring:  -Revealed no controlled medications written of any kind.    -Last report:   Last PDMP Lakisha Tran as Reviewed [facility]):  Review User Review Instant Review Result   [address],[facility], [address] 1/24/2022  9:43 AM Reviewed PDMP [1]       Allergies   Allergen Reactions    Sulfa Antibiotics      Pt states that her throat closes up        No current outpatient medications on file prior to visit.      Current Facility-Administered Medications on File Prior to Visit   Medication Dose Route Frequency Provider Last Rate Last Admin    medroxyPROGESTERone (DEPO-PROVERA) injection 150 mg  150 mg IntraMUSCular Once Fay Chiu MD        medroxyPROGESTERone (DEPO-PROVERA) injection 150 mg  150 mg IntraMUSCular Once Enoc Vilchis MA            Family History   Problem Relation Age of Onset    Asthma Mother     High Blood Pressure Paternal Aunt     High Cholesterol Paternal Uncle     Cancer Maternal Grandmother        Social History     Tobacco Use    Smoking status: Current Some Day Smoker     Packs/day: 0.50     Years: 10.00     Pack years: 5.00     Types: Cigarettes    Smokeless tobacco: Never Used   Substance Use Topics    Alcohol use: Not Currently        Lab Results   Component Value Date    WBC 6.5 05/29/2019    HGB 15.1 05/29/2019    HCT 43.7 05/29/2019    MCV 90.0 05/29/2019     05/29/2019       Chemistry        Component Value Date/Time     05/29/2019 1150    K 4.5 05/29/2019 1150     05/29/2019 1150    CO2 23 05/29/2019 1150    BUN 9 05/29/2019 1150    CREATININE 0.7 05/29/2019 1150        Component Value Date/Time    CALCIUM 9.9 05/29/2019 1150    ALKPHOS 66 05/29/2019 1150    AST 13 (L) 05/29/2019 1150    ALT 13 05/29/2019 1150    BILITOT 0.5 05/29/2019 1150          Lab Results   Component Value Date    ALT 13 05/29/2019    AST 13 (L) 05/29/2019    ALKPHOS 66 05/29/2019    BILITOT 0.5 05/29/2019     No results found for: LABA1C  No results found for: EAG    Review of Systems   Constitutional: Negative for activity change, appetite change, fatigue, fever and unexpected weight change. HENT: Negative for congestion, rhinorrhea, sinus pressure and trouble swallowing. Respiratory: Negative for cough, chest tightness, shortness of breath and wheezing. Cardiovascular: Negative for chest pain, palpitations and leg swelling. Gastrointestinal: Negative for abdominal distention, abdominal pain, blood in stool, constipation, diarrhea, nausea and vomiting. Genitourinary: Negative for dysuria, frequency and hematuria. Musculoskeletal: Negative for arthralgias and back pain. Skin: Negative for rash. Neurological: Negative for dizziness, weakness, light-headedness, numbness and headaches. Psychiatric/Behavioral: Positive for agitation, behavioral problems, decreased concentration, dysphoric mood and sleep disturbance.  Negative for hallucinations, self-injury and suicidal ideas. The patient is nervous/anxious and is hyperactive. Wt Readings from Last 3 Encounters:   01/24/22 178 lb 12.8 oz (81.1 kg)   04/15/20 156 lb (70.8 kg)   05/29/19 171 lb 4.8 oz (77.7 kg)       BP Readings from Last 3 Encounters:   01/24/22 118/86   04/15/20 112/78   05/29/19 104/78       Pulse Readings from Last 3 Encounters:   01/24/22 91   04/15/20 88   05/29/19 95       /86   Pulse 91   Temp 98.6 °F (37 °C)   Ht 5' 8.5\" (1.74 m)   Wt 178 lb 12.8 oz (81.1 kg)   SpO2 98%   BMI 26.79 kg/m²      Physical Exam  Vitals reviewed. Constitutional:       General: She is awake. She is not in acute distress. Appearance: She is not ill-appearing or diaphoretic. HENT:      Head: Normocephalic and atraumatic. No abrasion or masses. Hair is normal.      Right Ear: External ear normal.      Left Ear: External ear normal.      Nose: Nose normal.   Eyes:      General: Lids are normal. Gaze aligned appropriately. No scleral icterus. Right eye: No discharge. Left eye: No discharge. Extraocular Movements: Extraocular movements intact. Conjunctiva/sclera: Conjunctivae normal.   Neck:      Trachea: Phonation normal.   Cardiovascular:      Rate and Rhythm: Normal rate and regular rhythm. Pulmonary:      Effort: Pulmonary effort is normal. No respiratory distress. Breath sounds: No wheezing, rhonchi or rales. Abdominal:      General: Abdomen is flat. There is no distension. Palpations: Abdomen is soft. Musculoskeletal:         General: No deformity. Normal range of motion. Cervical back: Normal range of motion. No erythema. Right lower leg: No edema. Left lower leg: No edema. Skin:     Coloration: Skin is not cyanotic, jaundiced or pale. Findings: No abrasion, abscess, bruising, ecchymosis, erythema, signs of injury, laceration, lesion, petechiae, rash or wound. Neurological:      General: No focal deficit present.       Mental Status: She is alert. Mental status is at baseline. GCS: GCS eye subscore is 4. GCS verbal subscore is 5. GCS motor subscore is 6. Cranial Nerves: No cranial nerve deficit, dysarthria or facial asymmetry. Motor: No weakness, tremor, atrophy or seizure activity. Coordination: Coordination normal.      Gait: Gait is intact. Psychiatric:         Attention and Perception: Attention and perception normal.         Mood and Affect: Affect normal. Mood is anxious. Speech: She is communicative. Speech is rapid and pressured. Speech is not delayed, slurred or tangential.         Behavior: Behavior normal. Behavior is cooperative. Thought Content: Thought content normal.         Cognition and Memory: Cognition normal.         Judgment: Judgment normal.       Assessment/Plan:   Marge Bernabe was seen today for established new doctor, anxiety, depression, medication refill, manic behavior, discuss medications and insomnia. Diagnoses and all orders for this visit:    Encounter to establish care with new doctor    Mixed obsessional thoughts and acts  Comments:  Discussed with patient that Fluvoxamine is a SSRI that's FDA approved for treatment of anxiety/depression as well as OCD. Since she has been on SSRIs before, discussed with her that it would make an easier transition to start on this medication. From what it sounds like, she has not been on any antipsychotics let alone mood stabilizing agents. We discussed this about a potential viable option. We discussed about that the more medications that she tries out, especially generic medications that would make it easier to prescribe branded medications. I discussed with patient about the potential benefits of GeneSight testing. Patient was informed that it is a \"genetics test\" that provides a comprehensive report on medications used primarily for treatment of anxiety/depression, mood disorders as well as conditions regarding sleep disturbances and ADHD.   The main potential benefit with this test is that it may help provide a list of medication(s) that may most likely cause side effects, medication(s) that may be overall ineffective, and medication(s) that may have a increase risk of drug-to-drug interactions. The report will also include a list of medication(s) (of which there are several to choose from) that may provide notable positive benefit as well as those that carry overall lower risk of having side effect(s). Based on all this, GeneSight testing could essentially help with minimizing the trial and error with selecting medications. Therefore this test would be used as a tool or guide combined with my own judgement and clinical experience in selecting the appropriate medical therapy for the individual patient. After reviewing the potential benefits of having the GeneSight test, the patient understood my recommendation for testing and will think about doing testing at some point in the near future. Orders:  -     fluvoxaMINE (LUVOX) 50 MG tablet; Take 1 tablet by mouth nightly    Mixed anxiety and depressive disorder  -     fluvoxaMINE (LUVOX) 50 MG tablet; Take 1 tablet by mouth nightly       I reviewed the plan of care with the patient. Patient acknowledged understanding and agreed with plan of care overall.     Medications Discontinued During This Encounter   Medication Reason    pantoprazole (PROTONIX) 20 MG tablet LIST CLEANUP    fluticasone (FLONASE) 50 MCG/ACT nasal spray LIST CLEANUP    FLUoxetine (PROZAC) 40 MG capsule LIST CLEANUP    FLUoxetine (PROZAC) 20 MG capsule LIST CLEANUP    vitamin B-12 (CYANOCOBALAMIN) 500 MCG tablet LIST CLEANUP    Multiple Vitamins-Minerals (WOMENS MULTI VITAMIN & MINERAL) TABS LIST CLEANUP        General information on medications:  -When it comes to medications, whether with starting or adding a new medication or increasing the dose of a current medication, the benefits and risks have to always be considered and weighed over, especially if one is taking other medications as well. -There are no medications that have no side effects and that there is always a risk involved with taking a medication.    -If a side effect were to occur with starting a new medication or with increasing the dose of a current medication that either the medication can be totally discontinued altogether or simply decrease the dose of it and if this would be the case a follow-up appointment would be deemed necessary.    -The drug allergy list will then be updated with the corresponding side effect(s) if it's deemed to be a true 'drug allergy'. -The most common adverse effects of medication(s) were addressed at today's visit.    -Lastly, the coverage status of a medication may vary from insurance to insurance and the only way to verify if the medication is covered is to send an actual prescription in.    -The drug formulary of each insurance changes without any warning or notification to the healthcare provider let alone the pharmacy.  -The cost of medications vary from insurance to insurance and the cost is always subject to change just like the drug formulary. Follow-up: Return in about 24 days (around 2/17/2022) for IP - started on Luvox. .     Patient was informed that if his or her symptoms worsen to follow up with me sooner or go to the nearest ER if the symptoms are very significant and warrant higher level of care. Regarding my note:  -This note was composed (by me only and not with assistance via a scribe) to the best of my knowledge and recollection of the encounter with the patient using one of my own customized note templates utilizing a combination of typing and dictating with the [address] [place] [address] speech recognition software. As a result, the note may possibly have various errors (e.g. spelling, grammar, and non-sensible words/phrases/statements) despite reviewing the note prior to signing it for completion. -Total time for this encounter: level of service not based on length of visit. John Mondragon M.D.   [address]     Electronically signed by Tammy Sheikh on 1/24/2022 at 7:52 PM.

## 2022-02-13 NOTE — PROGRESS NOTES
Jude JuradoApoorvaBeni   37 y.o. female   1980    HPI:    Patient was last seen by me on 1/24/2022. During our last office visit:   -This was her first visit with me to establish care as her new PCP. Reason(s) for visit:   Chief Complaint   Patient presents with    Depression    Follow-up     Pt reported that medication is helping with her depression.  Other     Family hx of tourette syndrome.  Other     OCD. Was eating candy the other night and ate them in a specific order and color pattern.  Other     Pt stated that time perception is off. Has a hard time focuing during conversations. Mood disorder:  -Meds tried: Sertraline, Fluoxetine, Escitalopram (made her very tired), Paroxetine, Fluvoxamine  -History: issues with anxiety/depression since as far back as 12years old or even younger.  -Issues of OCD such as cleaning.  -Has described herself as a \"severe perfectionist.\"  -Occupation: works on arts & crafts   -Discussed [employer] during our [address]. Updates:  -Depression runs strongly on both sides of family  -Starting Luvox has helped with \"depression side\" compared to Fluoxetine.  -She has felt sporadically throughout since childhood random thoughts of feeling the world is better without her.  -Reported for first time to me that has [facility] of tourette's. Her daughter has tourette's and noticed it started ate age 11 yo triggered with strong emotions.  -Reported for first time of RLS issue that started when she was pregnant with her first child. Has had iron deficiency problems in the past.  -Patient has racing thoughts (random) that affect her ability to fall asleep.  -When asked about sleep meds, he stated that some in her family had issues with addiction to meds such as Ambien.    -Patient was given rx of one time Hydroxyzine 50 mg and took 7-8 times and it helped her feel calm.   -Patient reported that she has social anxiety.  -Patient has [facility] of ADHD and  has noticed that she's absent minded. Allergies   Allergen Reactions    Sulfa Antibiotics      Pt states that her throat closes up        No current outpatient medications on file prior to visit. No current facility-administered medications on file prior to visit. Family History   Problem Relation Age of Onset    Asthma Mother     Cancer Maternal Grandmother     High Blood Pressure Paternal Aunt     High Cholesterol Paternal Uncle     Parkinsonism Paternal Uncle     High Cholesterol Paternal Uncle     Stroke Maternal Aunt     High Blood Pressure Maternal Aunt     Other Maternal Aunt         Brain hemorrhage       Social History     Tobacco Use    Smoking status: Current Some Day Smoker     Packs/day: 0.50     Years: 10.00     Pack years: 5.00     Types: Cigarettes    Smokeless tobacco: Never Used   Substance Use Topics    Alcohol use: Not Currently        Lab Results   Component Value Date    WBC 6.5 05/29/2019    HGB 15.1 05/29/2019    HCT 43.7 05/29/2019    MCV 90.0 05/29/2019     05/29/2019         Chemistry        Component Value Date/Time     05/29/2019 1150    K 4.5 05/29/2019 1150     05/29/2019 1150    CO2 23 05/29/2019 1150    BUN 9 05/29/2019 1150    CREATININE 0.7 05/29/2019 1150        Component Value Date/Time    CALCIUM 9.9 05/29/2019 1150    ALKPHOS 66 05/29/2019 1150    AST 13 (L) 05/29/2019 1150    ALT 13 05/29/2019 1150    BILITOT 0.5 05/29/2019 1150          Lab Results   Component Value Date    ALT 13 05/29/2019    AST 13 (L) 05/29/2019    ALKPHOS 66 05/29/2019    BILITOT 0.5 05/29/2019       Review of Systems   Constitutional: Negative for activity change, appetite change, fatigue, fever and unexpected weight change. HENT: Negative for congestion, rhinorrhea, sinus pressure and trouble swallowing. Respiratory: Negative for cough, chest tightness, shortness of breath and wheezing. Cardiovascular: Negative for chest pain, palpitations and leg swelling. Gastrointestinal: Negative for abdominal distention, abdominal pain, blood in stool, constipation, diarrhea, nausea and vomiting. Genitourinary: Negative for dysuria, frequency and hematuria. Musculoskeletal: Negative for arthralgias and back pain. Skin: Negative for rash. Neurological: Negative for dizziness, weakness, light-headedness, numbness and headaches. Wt Readings from Last 3 Encounters:   02/17/22 180 lb 9.6 oz (81.9 kg)   01/24/22 178 lb 12.8 oz (81.1 kg)   04/15/20 156 lb (70.8 kg)       BP Readings from Last 3 Encounters:   02/17/22 110/76   01/24/22 118/86   04/15/20 112/78       Pulse Readings from Last 3 Encounters:   02/17/22 110   01/24/22 91   04/15/20 88       /76   Pulse 110   Temp 98 °F (36.7 °C)   Wt 180 lb 9.6 oz (81.9 kg)   SpO2 97%   BMI 27.06 kg/m²      Physical Exam  Vitals reviewed. Constitutional:       General: She is awake. She is not in acute distress. Appearance: She is overweight. She is not ill-appearing or diaphoretic. HENT:      Head: Normocephalic and atraumatic. No abrasion or masses. Hair is normal.      Right Ear: External ear normal.      Left Ear: External ear normal.      Nose: Nose normal.   Eyes:      General: Lids are normal. Gaze aligned appropriately. No scleral icterus. Right eye: No discharge. Left eye: No discharge. Extraocular Movements: Extraocular movements intact. Conjunctiva/sclera: Conjunctivae normal.   Neck:      Trachea: Phonation normal.   Cardiovascular:      Rate and Rhythm: Normal rate and regular rhythm. Pulmonary:      Effort: Pulmonary effort is normal. No respiratory distress. Breath sounds: No wheezing, rhonchi or rales. Abdominal:      General: Abdomen is flat. There is no distension. Palpations: Abdomen is soft. Musculoskeletal:         General: No deformity. Normal range of motion. Cervical back: Normal range of motion. No erythema. Right lower leg: No edema. Left lower leg: No edema. Skin:     Coloration: Skin is not cyanotic, jaundiced or pale. Findings: No abrasion, abscess, bruising, ecchymosis, erythema, signs of injury, laceration, lesion, petechiae, rash or wound. Neurological:      General: No focal deficit present. Mental Status: She is alert. Mental status is at baseline. GCS: GCS eye subscore is 4. GCS verbal subscore is 5. GCS motor subscore is 6. Cranial Nerves: No cranial nerve deficit, dysarthria or facial asymmetry. Motor: No weakness, tremor, atrophy or seizure activity. Coordination: Coordination normal.      Gait: Gait is intact. Psychiatric:         Attention and Perception: Attention and perception normal.         Mood and Affect: Affect normal. Mood is anxious. Speech: Speech is rapid and pressured. Behavior: Behavior normal. Behavior is cooperative. Thought Content: Thought content normal.       Assessment/Plan:   Obdulia Martinez was seen today for depression, follow-up, other, other and other. Diagnoses and all orders for this visit:    Mixed anxiety and depressive disorder  Comments:  Patient had partial positive response with starting fluoxetine. Will increase gradually as tolerated. I discussed with patient the difference between acute vs preventative/maintenance medications regarding treatment of anxiety/depression/bipolar disorder. Drugs of the SSRI/SNRI class as well as anti-psychotics can have side effects such as weight gain, sexual dysfunction, insomnia, headache, abdominal discomfort, nausea, vomiting, etc. These medications are generally effective at alleviating symptoms of anxiety and/or depression, but side effects tend to occur within the first 1-2 weeks of taking the medication. Patient was informed to let me know if any significant side effects do occur.      Patient was instructed to take the medication(s) every day as directed and that this medication is not an as needed medication because the medication may take at least 2 weeks to see a difference if not at least 4-6 weeks to have a beneficial effect and that by going even 1-2 days without taking the medication one could risk of having rebound anxiety/depression. In addition, the patient was informed that this medication cannot be abruptly stopped after having had taken it for a long period of time and that the medication would have to be gradually tapered off under the guidance of a healthcare provider. The patient was informed that 4-6 weeks follow-up is generally recommended follow-up time for starting/adding a new medication or with adjusting the dose of any given medication. I have discussed with patient (at some point) and made aware that some patients may benefit from more than one medication compared to monotherapy. A combination of both medical and behavioral therapy may yield better/more effective results than either therapy alone. Lastly, it's worth mentioning that the medication(s) prescribed will not completely resolve feelings of anxiety/depression as well as make one immune or completely prevent from having more issues with anxiety and/or depression. Stress (emotional & physical) is part of everyday life. Orders:  -     fluvoxaMINE (LUVOX) 50 MG tablet; Take 1 tablet by mouth 2 times daily  -     hydrOXYzine (ATARAX) 25 MG tablet; Take 1 tablet by mouth every 12 hours as needed for Anxiety (insomnia)    Mixed obsessional thoughts and acts  Comments:  May need to be around 200-300 mg of Fluvoxamine to help treat OCD. Orders:  -     fluvoxaMINE (LUVOX) 50 MG tablet; Take 1 tablet by mouth 2 times daily    Social anxiety disorder  -     fluvoxaMINE (LUVOX) 50 MG tablet; Take 1 tablet by mouth 2 times daily    Sleep disturbances  Comments:  Hydroxyzine 50 mg was a bit too much. Advised to try 1-1.5 tabs of it. Orders:  -     hydrOXYzine (ATARAX) 25 MG tablet;  Take 1 tablet by mouth every 12 hours as needed for Anxiety (insomnia)    Restless legs syndrome (RLS)  Comments: Will start patient on trial of Pramipexole. Orders:  -     pramipexole (MIRAPEX) 0.25 MG tablet; Take 1 tablet by mouth nightly  -     Ferritin; Future  -     Iron and TIBC; Future  -     TSH; Future  -     T4, Free; Future    Bilateral leg paresthesia  -     Vitamin D 25 Hydroxy; Future  -     Magnesium; Future    Family history of attention deficit hyperactivity disorder (ADHD)  Comments:  Discussed with patient about considering a trial of ADHD med after stabilizing anxiety/depression/OCD issue. I reviewed the plan of care with the patient. Patient acknowledged understanding and agreed with plan of care overall. Medications Discontinued During This Encounter   Medication Reason    medroxyPROGESTERone (DEPO-PROVERA) injection 150 mg     medroxyPROGESTERone (DEPO-PROVERA) injection 150 mg     hydrOXYzine (ATARAX) 50 MG tablet LIST CLEANUP    fluvoxaMINE (LUVOX) 50 MG tablet DOSE ADJUSTMENT        General information on medications:  -When it comes to medications, whether with starting or adding a new medication or increasing the dose of a current medication, the benefits and risks have to always be considered and weighed over, especially if one is taking other medications as well. -There are no medications that have no side effects and that there is always a risk involved with taking a medication.    -If a side effect were to occur with starting a new medication or with increasing the dose of a current medication that either the medication can be totally discontinued altogether or simply decrease the dose of it and if this would be the case a follow-up appointment would be deemed necessary.    -The drug allergy list will then be updated with the corresponding side effect(s) if it's deemed to be a true 'drug allergy'.     -The most common adverse effects of medication(s) were addressed at today's visit.    -Lastly, the coverage status of a medication may vary from insurance to insurance and the only way to verify if the medication is covered is to send an actual prescription in.    -The drug formulary of each insurance changes without any warning or notification to the healthcare provider let alone the pharmacy.  -The cost of medications vary from insurance to insurance and the cost is always subject to change just like the drug formulary. Follow-up: Return in about 4 weeks (around 3/17/2022) for IP - restless legs, depression. .     Patient was informed that if his or her symptoms worsen to follow up with me sooner or go to the nearest ER if the symptoms are very significant and warrant higher level of care. Regarding my note:  -This note was composed (by me only and not with assistance via a scribe) to the best of my knowledge and recollection of the encounter with the patient using one of my own customized note templates utilizing a combination of typing and dictating with the [address] [place] [address] speech recognition software. As a result, the note may possibly contain various errors (e.g. spelling, grammar, and non-sensible words/phrases/statements) despite reviewing the note prior to signing it for completion. Time spent includes some or all of the following, both face-to-face time and non face-to-face time, but is not limited to:  [x] Preparing to see the patient by reviewing medical records available (notes, labs, imaging, etc.) prior to seeing the patient. [x] Obtaining and/or reviewing the history from the patient. [x] Performing a medically appropriate examination. [x] Ordering of relevant lab work, medications, referrals, or procedures. [x] Discussing patient's medical issues and formulating an assessment and plan. [x] Reviewing plan of care with patient. Answering any questions or concerns.    [x] Documentation within the electronic health record (EHR)  [] Reviewing records of history relevant to patient's issues after seeing the patient. [] Discussion or coordination of care with other health care professionals  [x] Other: duration: 32 minutes - I spent a significant amount of time discussing various issues as noted above and also with formulating a treatment plan for each specific issue. Patient was given the opportunity to ask me any questions and address any concerns/issues. I also reviewed lab work (if available) as well as prior notes from PCP and/or other specialists if available. John De Santiago Mai, M.D.   [address] [place] [address]    Electronically signed by Brian Treadwell M.D. on 2/17/2022 at 12:42 PM.

## 2022-02-17 ENCOUNTER — OFFICE VISIT (OUTPATIENT)
Dept: FAMILY MEDICINE CLINIC | Age: 42
End: 2022-02-17
Payer: COMMERCIAL

## 2022-02-17 ENCOUNTER — TELEPHONE (OUTPATIENT)
Dept: FAMILY MEDICINE CLINIC | Age: 42
End: 2022-02-17

## 2022-02-17 VITALS
WEIGHT: 180.6 LBS | OXYGEN SATURATION: 97 % | BODY MASS INDEX: 27.06 KG/M2 | DIASTOLIC BLOOD PRESSURE: 76 MMHG | TEMPERATURE: 98 F | SYSTOLIC BLOOD PRESSURE: 110 MMHG | HEART RATE: 110 BPM

## 2022-02-17 DIAGNOSIS — F40.10 SOCIAL ANXIETY DISORDER: ICD-10-CM

## 2022-02-17 DIAGNOSIS — F41.8 MIXED ANXIETY AND DEPRESSIVE DISORDER: Primary | ICD-10-CM

## 2022-02-17 DIAGNOSIS — Z81.8 FAMILY HISTORY OF ATTENTION DEFICIT HYPERACTIVITY DISORDER (ADHD): ICD-10-CM

## 2022-02-17 DIAGNOSIS — R20.2 BILATERAL LEG PARESTHESIA: ICD-10-CM

## 2022-02-17 DIAGNOSIS — F42.2 MIXED OBSESSIONAL THOUGHTS AND ACTS: ICD-10-CM

## 2022-02-17 DIAGNOSIS — G47.9 SLEEP DISTURBANCES: ICD-10-CM

## 2022-02-17 DIAGNOSIS — G25.81 RESTLESS LEGS SYNDROME (RLS): ICD-10-CM

## 2022-02-17 PROCEDURE — 4004F PT TOBACCO SCREEN RCVD TLK: CPT | Performed by: FAMILY MEDICINE

## 2022-02-17 PROCEDURE — G8427 DOCREV CUR MEDS BY ELIG CLIN: HCPCS | Performed by: FAMILY MEDICINE

## 2022-02-17 PROCEDURE — G8484 FLU IMMUNIZE NO ADMIN: HCPCS | Performed by: FAMILY MEDICINE

## 2022-02-17 PROCEDURE — 99214 OFFICE O/P EST MOD 30 MIN: CPT | Performed by: FAMILY MEDICINE

## 2022-02-17 PROCEDURE — G8419 CALC BMI OUT NRM PARAM NOF/U: HCPCS | Performed by: FAMILY MEDICINE

## 2022-02-17 RX ORDER — FLUVOXAMINE MALEATE 50 MG/1
50 TABLET, COATED ORAL 2 TIMES DAILY
Qty: 60 TABLET | Refills: 2 | Status: SHIPPED | OUTPATIENT
Start: 2022-02-17 | End: 2022-05-06 | Stop reason: SDUPTHER

## 2022-02-17 RX ORDER — HYDROXYZINE HYDROCHLORIDE 25 MG/1
25 TABLET, FILM COATED ORAL EVERY 12 HOURS PRN
Qty: 60 TABLET | Refills: 0 | Status: SHIPPED | OUTPATIENT
Start: 2022-02-17 | End: 2022-05-06 | Stop reason: SDUPTHER

## 2022-02-17 RX ORDER — PRAMIPEXOLE DIHYDROCHLORIDE 0.25 MG/1
0.25 TABLET ORAL NIGHTLY
Qty: 90 TABLET | Refills: 3 | Status: SHIPPED | OUTPATIENT
Start: 2022-02-17 | End: 2022-03-23 | Stop reason: DRUGHIGH

## 2022-02-17 ASSESSMENT — ENCOUNTER SYMPTOMS
SHORTNESS OF BREATH: 0
COUGH: 0
RHINORRHEA: 0
TROUBLE SWALLOWING: 0
BLOOD IN STOOL: 0
BACK PAIN: 0
SINUS PRESSURE: 0
VOMITING: 0
CHEST TIGHTNESS: 0
DIARRHEA: 0
NAUSEA: 0
ABDOMINAL PAIN: 0
CONSTIPATION: 0
ABDOMINAL DISTENTION: 0
WHEEZING: 0

## 2022-02-21 ENCOUNTER — TELEPHONE (OUTPATIENT)
Dept: ORTHOPEDIC SURGERY | Age: 42
End: 2022-02-21

## 2022-02-21 NOTE — TELEPHONE ENCOUNTER
LVM for pt to call the office back.
PA submitted via CMM for fluvoxaMINE Maleate 50MG tablets. Key: [id number]    Response on CMM: Your PA has been resolved, no PA is required.
Spoke with pt and advised PA status. Pt verbalized understanding.
No

## 2022-03-12 DIAGNOSIS — G47.9 SLEEP DISTURBANCES: ICD-10-CM

## 2022-03-12 DIAGNOSIS — F41.8 MIXED ANXIETY AND DEPRESSIVE DISORDER: ICD-10-CM

## 2022-03-14 RX ORDER — HYDROXYZINE HYDROCHLORIDE 25 MG/1
25 TABLET, FILM COATED ORAL EVERY 12 HOURS PRN
Qty: 60 TABLET | Refills: 0 | OUTPATIENT
Start: 2022-03-14 | End: 2022-04-13

## 2022-03-19 NOTE — PROGRESS NOTES
[facility]la[facility]   37 y.o. female   1980    HPI:    Patient was last seen by me on 2/17/2022. During our last office visit:   -Increased Fluvoxamine from 50 mg daily to BID  -Started patient on hydroxyzine 25 mg q12hr PRN for anxiety/insomnia issues.  -Started patient on Pramipexole 0.25 mg nightly for RLS. Reason(s) for visit:   Chief Complaint   Patient presents with    Other     Restless legs. Pt stated that [facility] will only pay for rx's in 3mo supply.  Depression     Mood disorder:  -Meds tried: Sertraline, Fluoxetine, Escitalopram (made her very tired), Paroxetine, Fluvoxamine  -Personal history: issues with anxiety/depression since as far back as 12years old or even younger. -[address]: -Depression runs strongly on both sides of family  -Issues of OCD such as cleaning.  -Has described herself as a \"severe perfectionist.\"  -Occupation: works on arts & crafts   -Discussed [facility]+ test during our [address].     LOV note:  -Starting Tony Mings has helped with \"depression side\" compared to Fluoxetine.  -She has felt sporadically throughout since childhood random thoughts of feeling the world is better without her.  -Reported for first time to me that has [address] of tourette's. Her daughter has tourette's and noticed it started ate age 11 yo triggered with strong emotions.  -Reported for first time of RLS issue that started when she was pregnant with her first child. Has had iron deficiency problems in the past.  -Patient has racing thoughts (random) that affect her ability to fall asleep.  -When asked about sleep meds, she stated that some in her family had issues with addiction to meds such as Ambien.    -Patient was given rx of one time Hydroxyzine 50 mg and took 7-8 times and it helped her feel calm.   -Patient reported that she has social anxiety.  -Patient has [address] of ADHD and  has noticed that she's absent minded.     Updates:  -Has been under some stress lately due to running up and down between [place] and here b/c her cousin's (who has Tourette's) car broke down.  -Patient stated that her  (who was not present) wanted her to discuss with me that she can't sit still and doesn't like it.  told her that he is distracted by her getting up and down and has a hard time watching his favorite TV shows.    -Patient reported that she's \"always had a short attention span\" and sometimes doesn't notice it. She also endorsed racing thoughts and inability to complete tasks.  - wants CMP because patient has [facility] of endometriosis as well as uterine fibroids before age 30. She reported of personal history of recurrent ovarian cysts (?) and had to be \"burned off each time\". She reported of recurrent intermittent LLQ pain. No history of hysterectomy. She reported she had a colonoscopy done at some point and saw a cecal mass, but didn't \"james it\" and was referred to various specialists that possible surgery needs to be done. She stated she had numerous imaging studies done and mass could never be found. She reported of hx of diverticulitis and IBS. She was told by her last GYN about 7 years ago that she did not have endometriosis. -She reported of regular periods, but long periods of 10-14 days when she was a teenager.  -She reported that at one point in her life she did not have a period for \"about 9 months\". -She stated recently that prior to starting her period, she has a lot of back pain and in her \"ovaries\" and now having blood clots.   This was not before.  -Patient reported that she has \"no sex drive\" that was present before starting anti-depressant.  -Patient vocalized seeing a therapist.    Restless legs:  -Meds tried: Pramipexole    Updates:  -Patient reported that she has seen some relief with starting Pramipexole.  -She stated that as long she's busy, her legs don't bother her.  -She has RLS during daytime as well    Allergies   Allergen Reactions    Sulfa Antibiotics Pt states that her throat closes up        Current Outpatient Medications on File Prior to Visit   Medication Sig Dispense Refill    fluvoxaMINE (LUVOX) 50 MG tablet Take 1 tablet by mouth 2 times daily 60 tablet 2    hydrOXYzine (ATARAX) 25 MG tablet Take 1 tablet by mouth every 12 hours as needed for Anxiety (insomnia) 60 tablet 0     No current facility-administered medications on file prior to visit. Family History   Problem Relation Age of Onset    Asthma Mother     Cancer Maternal Grandmother     High Blood Pressure Paternal Aunt     High Cholesterol Paternal Uncle     Parkinsonism Paternal Uncle     High Cholesterol Paternal Uncle     Stroke Maternal Aunt     High Blood Pressure Maternal Aunt     Other Maternal Aunt         Brain hemorrhage       Social History     Tobacco Use    Smoking status: Current Some Day Smoker     Packs/day: 0.50     Years: 10.00     Pack years: 5.00     Types: Cigarettes    Smokeless tobacco: Never Used   Substance Use Topics    Alcohol use: Not Currently        Lab Results   Component Value Date    WBC 6.5 05/29/2019    HGB 15.1 05/29/2019    HCT 43.7 05/29/2019    MCV 90.0 05/29/2019     05/29/2019         Chemistry        Component Value Date/Time     05/29/2019 1150    K 4.5 05/29/2019 1150     05/29/2019 1150    CO2 23 05/29/2019 1150    BUN 9 05/29/2019 1150    CREATININE 0.7 05/29/2019 1150        Component Value Date/Time    CALCIUM 9.9 05/29/2019 1150    ALKPHOS 66 05/29/2019 1150    AST 13 (L) 05/29/2019 1150    ALT 13 05/29/2019 1150    BILITOT 0.5 05/29/2019 1150          Lab Results   Component Value Date    ALT 13 05/29/2019    AST 13 (L) 05/29/2019    ALKPHOS 66 05/29/2019    BILITOT 0.5 05/29/2019       Review of Systems   Constitutional: Negative for activity change, appetite change, fatigue, fever and unexpected weight change. HENT: Negative for congestion, rhinorrhea, sinus pressure and trouble swallowing.     Respiratory: Negative for cough, chest tightness, shortness of breath and wheezing. Cardiovascular: Negative for chest pain, palpitations and leg swelling. Gastrointestinal: Negative for abdominal distention, abdominal pain, blood in stool, constipation, diarrhea, nausea and vomiting. Genitourinary: Negative for dysuria, frequency and hematuria. Musculoskeletal: Negative for arthralgias and back pain. Skin: Negative for rash. Neurological: Negative for dizziness, weakness, light-headedness, numbness and headaches. Wt Readings from Last 3 Encounters:   03/23/22 183 lb 12.8 oz (83.4 kg)   02/17/22 180 lb 9.6 oz (81.9 kg)   01/24/22 178 lb 12.8 oz (81.1 kg)       BP Readings from Last 3 Encounters:   03/23/22 122/82   02/17/22 110/76   01/24/22 118/86       Pulse Readings from Last 3 Encounters:   03/23/22 97   02/17/22 110   01/24/22 91       /82   Pulse 97   Temp 98.1 °F (36.7 °C)   Wt 183 lb 12.8 oz (83.4 kg)   SpO2 100%   BMI 27.54 kg/m²      Physical Exam  Vitals reviewed. Constitutional:       General: She is awake. She is not in acute distress. Appearance: She is overweight. She is not ill-appearing or diaphoretic. HENT:      Head: Normocephalic and atraumatic. No abrasion or masses. Hair is normal.      Right Ear: External ear normal.      Left Ear: External ear normal.      Nose: Nose normal.   Eyes:      General: Lids are normal. Gaze aligned appropriately. No scleral icterus. Right eye: No discharge. Left eye: No discharge. Extraocular Movements: Extraocular movements intact. Conjunctiva/sclera: Conjunctivae normal.   Neck:      Trachea: Phonation normal.   Cardiovascular:      Rate and Rhythm: Normal rate and regular rhythm. Pulmonary:      Effort: Pulmonary effort is normal. No respiratory distress. Breath sounds: No wheezing, rhonchi or rales. Abdominal:      General: Abdomen is flat. There is no distension. Palpations: Abdomen is soft. Musculoskeletal:         General: No deformity. Normal range of motion. Cervical back: Normal range of motion. No erythema. Right lower leg: No edema. Left lower leg: No edema. Skin:     Coloration: Skin is not cyanotic, jaundiced or pale. Findings: No abrasion, abscess, bruising, ecchymosis, erythema, signs of injury, laceration, lesion, petechiae, rash or wound. Neurological:      General: No focal deficit present. Mental Status: She is alert. Mental status is at baseline. GCS: GCS eye subscore is 4. GCS verbal subscore is 5. GCS motor subscore is 6. Cranial Nerves: No cranial nerve deficit, dysarthria or facial asymmetry. Motor: No weakness, tremor, atrophy or seizure activity. Coordination: Coordination normal.      Gait: Gait is intact. Psychiatric:         Attention and Perception: Attention and perception normal.         Mood and Affect: Mood and affect normal.         Speech: Speech normal.         Behavior: Behavior normal. Behavior is cooperative. Thought Content: Thought content normal.       Assessment/Plan:   Gauri Leonard was seen today for other and depression. Diagnoses and all orders for this visit:    M[facility] ([facility]  Comments: Will continue Luvox for now. Consider a trial of mood stabilizer and/or switching to or adding an anti-psychotic. I think starting her on the right ADHD medical regiment may help her issues with anxiety/depression/mood swings. Mixed obsessional thoughts and acts    Loss of libido  Comments:  Consider switching to Bupropion or Viibryd. Attention deficit hyperactivity disorder (ADHD), combined type  Comments: Will start on ADHD med for first time (Strattera). Orders:  -     atomoxetine (STRATTERA) 40 MG capsule; Take 1 capsule by mouth daily    Dysmenorrhea, unspecified  Comments:  Informed her that there are no biomarkers associated with endometriosis. Despite this, she requested hormone testing. Definitive diagnosis is with visualization. Orders:  -     dicyclomine (BENTYL) 20 MG tablet; Take 1 tablet by mouth every 8 hours as needed (abdominal cramping)  -     Heat Wraps (THERMACARE BACK/HIP) MISC; Apply 1 patch q12hr PRN for low back pain  -     US NON OB TRANSVAGINAL; Future  -     Follicle Stimulating Hormone; Future  -     Luteinizing Hormone; Future  -     Estradiol; Future  -     CBC with Auto Differential; Future  -     AFL - Doug Stark MD, Obstetrics, [facility] - [facility]    Intermittent left lower quadrant abdominal pain  -     US NON OB TRANSVAGINAL; Future  -     Renal Function Panel; Future  -     Hepatic Function Panel; Future    Restless legs syndrome (RLS)  Comments: Will start patient on trial of Pramipexole. Orders:  -     pramipexole (MIRAPEX) 0.5 MG tablet; Take 1 tablet by mouth nightly    Irritable bowel syndrome with both constipation and diarrhea  Comments:  Continue SSRI treatment. Stress management. I reviewed the plan of care with the patient. Patient acknowledged understanding and agreed with plan of care overall. Medications Discontinued During This Encounter   Medication Reason    pramipexole (MIRAPEX) 0.25 MG tablet DOSE ADJUSTMENT        General information on medications:  -When it comes to medications, whether with starting or adding a new medication or increasing the dose of a current medication, the benefits and risks have to always be considered and weighed over, especially if one is taking other medications as well.    -There are no medications that have no side effects and that there is always a risk involved with taking a medication.    -If a side effect were to occur with starting a new medication or with increasing the dose of a current medication that either the medication can be totally discontinued altogether or simply decrease the dose of it and if this would be the case a follow-up appointment would be deemed necessary.    -The drug allergy list will then be updated with the corresponding side effect(s) if it's deemed to be a true 'drug allergy'. -The most common adverse effects of medication(s) were addressed at today's visit.    -Lastly, the coverage status of a medication may vary from insurance to insurance and the only way to verify if the medication is covered is to send an actual prescription in.    -The drug formulary of each insurance changes without any warning or notification to the healthcare provider let alone the pharmacy.  -The cost of medications vary from insurance to insurance and the cost is always subject to change just like the drug formulary. Follow-up: Return in about 4 weeks (around 4/20/2022) for IP - ADHD, bipolar. .     Patient was informed that if his or her symptoms worsen to follow up with me sooner or go to the nearest ER if the symptoms are very significant and warrant higher level of care. Regarding my note:  -This note was composed (by me only and not with assistance via a scribe) to the best of my knowledge and recollection of the encounter with the patient using one of my own customized note templates utilizing a combination of typing and dictating with the [address] [place] [address] speech recognition software. As a result, the note may possibly contain various errors (e.g. spelling, grammar, and non-sensible words/phrases/statements) despite reviewing the note prior to signing it for completion. Time spent includes some or all of the following, both face-to-face time and non face-to-face time, but is not limited to:  [x] Preparing to see the patient by reviewing medical records available (notes, labs, imaging, etc.) prior to seeing the patient. [x] Obtaining and/or reviewing the history from the patient. [x] Performing a medically appropriate examination. [x] Ordering of relevant lab work, medications, referrals, or procedures. [x] Discussing patient's medical issues and formulating an assessment and plan. [x] Reviewing plan of care with patient. Answering any questions or concerns. [x] Documentation within the electronic health record (EHR)  [] Reviewing records of history relevant to patient's issues after seeing the patient. [] Discussion or coordination of care with other health care professionals  [x] Other: duration: 45 minutes    John Upton M.D.   [address] [place] [address]    Electronically signed by Sumanth Onofre M.D. on 3/23/2022 at 6:35 PM.

## 2022-03-23 ENCOUNTER — OFFICE VISIT (OUTPATIENT)
Dept: FAMILY MEDICINE CLINIC | Age: 42
End: 2022-03-23
Payer: COMMERCIAL

## 2022-03-23 VITALS
OXYGEN SATURATION: 100 % | WEIGHT: 183.8 LBS | HEART RATE: 97 BPM | TEMPERATURE: 98.1 F | BODY MASS INDEX: 27.54 KG/M2 | DIASTOLIC BLOOD PRESSURE: 82 MMHG | SYSTOLIC BLOOD PRESSURE: 122 MMHG

## 2022-03-23 DIAGNOSIS — F39 MOOD DISORDER (HCC): Primary | ICD-10-CM

## 2022-03-23 DIAGNOSIS — K58.2 IRRITABLE BOWEL SYNDROME WITH BOTH CONSTIPATION AND DIARRHEA: ICD-10-CM

## 2022-03-23 DIAGNOSIS — R68.82 LOSS OF LIBIDO: ICD-10-CM

## 2022-03-23 DIAGNOSIS — R10.32 INTERMITTENT LEFT LOWER QUADRANT ABDOMINAL PAIN: ICD-10-CM

## 2022-03-23 DIAGNOSIS — N94.6 DYSMENORRHEA, UNSPECIFIED: ICD-10-CM

## 2022-03-23 DIAGNOSIS — F42.2 MIXED OBSESSIONAL THOUGHTS AND ACTS: ICD-10-CM

## 2022-03-23 DIAGNOSIS — R20.2 BILATERAL LEG PARESTHESIA: ICD-10-CM

## 2022-03-23 DIAGNOSIS — G25.81 RESTLESS LEGS SYNDROME (RLS): ICD-10-CM

## 2022-03-23 DIAGNOSIS — F90.2 ATTENTION DEFICIT HYPERACTIVITY DISORDER (ADHD), COMBINED TYPE: ICD-10-CM

## 2022-03-23 LAB
ALBUMIN SERPL-MCNC: 4.3 G/DL (ref 3.4–5)
ALP BLD-CCNC: 66 U/L (ref 40–129)
ALT SERPL-CCNC: 23 U/L (ref 10–40)
ANION GAP SERPL CALCULATED.3IONS-SCNC: 13 MMOL/L (ref 3–16)
AST SERPL-CCNC: 22 U/L (ref 15–37)
BASOPHILS ABSOLUTE: 0.1 K/UL (ref 0–0.2)
BASOPHILS RELATIVE PERCENT: 0.9 %
BILIRUB SERPL-MCNC: <0.2 MG/DL (ref 0–1)
BILIRUBIN DIRECT: <0.2 MG/DL (ref 0–0.3)
BILIRUBIN, INDIRECT: NORMAL MG/DL (ref 0–1)
BUN BLDV-MCNC: 11 MG/DL (ref 7–20)
CALCIUM SERPL-MCNC: 9.4 MG/DL (ref 8.3–10.6)
CHLORIDE BLD-SCNC: 102 MMOL/L (ref 99–110)
CO2: 22 MMOL/L (ref 21–32)
CREAT SERPL-MCNC: 0.6 MG/DL (ref 0.6–1.1)
EOSINOPHILS ABSOLUTE: 0.2 K/UL (ref 0–0.6)
EOSINOPHILS RELATIVE PERCENT: 3.7 %
ESTRADIOL LEVEL: 117 PG/ML
FERRITIN: 45.7 NG/ML (ref 15–150)
FOLLICLE STIMULATING HORMONE: 2.3 MIU/ML
GFR AFRICAN AMERICAN: >60
GFR NON-AFRICAN AMERICAN: >60
GLUCOSE BLD-MCNC: 85 MG/DL (ref 70–99)
HCT VFR BLD CALC: 41.4 % (ref 36–48)
HEMOGLOBIN: 13.8 G/DL (ref 12–16)
IRON SATURATION: 30 % (ref 15–50)
IRON: 95 UG/DL (ref 37–145)
LUTEINIZING HORMONE: 2.1 MIU/ML
LYMPHOCYTES ABSOLUTE: 1.7 K/UL (ref 1–5.1)
LYMPHOCYTES RELATIVE PERCENT: 27 %
MAGNESIUM: 2.1 MG/DL (ref 1.8–2.4)
MCH RBC QN AUTO: 29.8 PG (ref 26–34)
MCHC RBC AUTO-ENTMCNC: 33.3 G/DL (ref 31–36)
MCV RBC AUTO: 89.7 FL (ref 80–100)
MONOCYTES ABSOLUTE: 0.7 K/UL (ref 0–1.3)
MONOCYTES RELATIVE PERCENT: 11.3 %
NEUTROPHILS ABSOLUTE: 3.6 K/UL (ref 1.7–7.7)
NEUTROPHILS RELATIVE PERCENT: 57.1 %
PDW BLD-RTO: 12.7 % (ref 12.4–15.4)
PHOSPHORUS: 3.5 MG/DL (ref 2.5–4.9)
PLATELET # BLD: 367 K/UL (ref 135–450)
PMV BLD AUTO: 8.4 FL (ref 5–10.5)
POTASSIUM SERPL-SCNC: 4.4 MMOL/L (ref 3.5–5.1)
RBC # BLD: 4.61 M/UL (ref 4–5.2)
SODIUM BLD-SCNC: 137 MMOL/L (ref 136–145)
T4 FREE: 1.2 NG/DL (ref 0.9–1.8)
TOTAL IRON BINDING CAPACITY: 316 UG/DL (ref 260–445)
TOTAL PROTEIN: 6.5 G/DL (ref 6.4–8.2)
TSH SERPL DL<=0.05 MIU/L-ACNC: 1.41 UIU/ML (ref 0.27–4.2)
VITAMIN D 25-HYDROXY: 28.2 NG/ML
WBC # BLD: 6.2 K/UL (ref 4–11)

## 2022-03-23 PROCEDURE — G8427 DOCREV CUR MEDS BY ELIG CLIN: HCPCS | Performed by: FAMILY MEDICINE

## 2022-03-23 PROCEDURE — G8419 CALC BMI OUT NRM PARAM NOF/U: HCPCS | Performed by: FAMILY MEDICINE

## 2022-03-23 PROCEDURE — 99215 OFFICE O/P EST HI 40 MIN: CPT | Performed by: FAMILY MEDICINE

## 2022-03-23 PROCEDURE — G8484 FLU IMMUNIZE NO ADMIN: HCPCS | Performed by: FAMILY MEDICINE

## 2022-03-23 PROCEDURE — 4004F PT TOBACCO SCREEN RCVD TLK: CPT | Performed by: FAMILY MEDICINE

## 2022-03-23 RX ORDER — PRAMIPEXOLE DIHYDROCHLORIDE 0.5 MG/1
0.5 TABLET ORAL NIGHTLY
Qty: 30 TABLET | Refills: 1 | Status: SHIPPED | OUTPATIENT
Start: 2022-03-23 | End: 2022-07-11 | Stop reason: SDUPTHER

## 2022-03-23 RX ORDER — DICYCLOMINE HCL 20 MG
20 TABLET ORAL EVERY 8 HOURS PRN
Qty: 30 TABLET | Refills: 2 | Status: SHIPPED | OUTPATIENT
Start: 2022-03-23

## 2022-03-23 RX ORDER — ATOMOXETINE 40 MG/1
40 CAPSULE ORAL DAILY
Qty: 30 CAPSULE | Refills: 3 | Status: SHIPPED | OUTPATIENT
Start: 2022-03-23 | End: 2022-05-09 | Stop reason: SDUPTHER

## 2022-03-23 ASSESSMENT — ENCOUNTER SYMPTOMS
CONSTIPATION: 0
TROUBLE SWALLOWING: 0
COUGH: 0
ABDOMINAL DISTENTION: 0
VOMITING: 0
BACK PAIN: 0
BLOOD IN STOOL: 0
RHINORRHEA: 0
WHEEZING: 0
SINUS PRESSURE: 0
NAUSEA: 0
DIARRHEA: 0
CHEST TIGHTNESS: 0
ABDOMINAL PAIN: 0
SHORTNESS OF BREATH: 0

## 2022-03-28 ENCOUNTER — PATIENT MESSAGE (OUTPATIENT)
Dept: FAMILY MEDICINE CLINIC | Age: 42
End: 2022-03-28

## 2022-03-28 NOTE — TELEPHONE ENCOUNTER
From: Mykel Zamora  To: Dr. Mayco Guo: 3/28/2022 4:30 PM EDT  Subject: My hormone tests    Good afternoon just wondering how the hormone test came out on my chart I see my value but I don't see what the normal value would be or I didn't understand what I was reading. I know I need to get more vitamin D and iron I read that part. Just wondering about the hormone part of the testing.  Thanks

## 2022-04-14 DIAGNOSIS — F90.2 ATTENTION DEFICIT HYPERACTIVITY DISORDER (ADHD), COMBINED TYPE: ICD-10-CM

## 2022-04-14 DIAGNOSIS — G25.81 RESTLESS LEGS SYNDROME (RLS): ICD-10-CM

## 2022-04-14 RX ORDER — PRAMIPEXOLE DIHYDROCHLORIDE 0.5 MG/1
0.5 TABLET ORAL NIGHTLY
Qty: 30 TABLET | Refills: 1 | OUTPATIENT
Start: 2022-04-14

## 2022-04-14 RX ORDER — ATOMOXETINE 40 MG/1
CAPSULE ORAL
Qty: 30 CAPSULE | Refills: 3 | OUTPATIENT
Start: 2022-04-14

## 2022-04-27 DIAGNOSIS — F41.8 MIXED ANXIETY AND DEPRESSIVE DISORDER: ICD-10-CM

## 2022-04-27 DIAGNOSIS — G47.9 SLEEP DISTURBANCES: ICD-10-CM

## 2022-04-27 RX ORDER — HYDROXYZINE HYDROCHLORIDE 25 MG/1
25 TABLET, FILM COATED ORAL EVERY 12 HOURS PRN
Qty: 60 TABLET | Refills: 0 | OUTPATIENT
Start: 2022-04-27 | End: 2022-05-27

## 2022-05-06 DIAGNOSIS — F40.10 SOCIAL ANXIETY DISORDER: ICD-10-CM

## 2022-05-06 DIAGNOSIS — F42.2 MIXED OBSESSIONAL THOUGHTS AND ACTS: ICD-10-CM

## 2022-05-06 DIAGNOSIS — F90.2 ATTENTION DEFICIT HYPERACTIVITY DISORDER (ADHD), COMBINED TYPE: ICD-10-CM

## 2022-05-06 DIAGNOSIS — F41.8 MIXED ANXIETY AND DEPRESSIVE DISORDER: ICD-10-CM

## 2022-05-06 DIAGNOSIS — G47.9 SLEEP DISTURBANCES: ICD-10-CM

## 2022-05-06 NOTE — TELEPHONE ENCOUNTER
I could switch her from Fluvoxaxmine to Bupropion XL, which can help with someone with both anxiety/depression and ADHD. By itself, Bupropion XL, is not meant to be a standalone medication for treatment of ADHD. If she would like to start Bupropion XL then she does not need to taper off of Fluvoxamine. She can take it at least 24 hours from when she last took Fluvoxamine. Unfortunately, there are no other non-stimulant options (besides Atomoxetine) for adults with ADHD. John Medina 177

## 2022-05-06 NOTE — TELEPHONE ENCOUNTER
Pt looking for \"off brand\" version of atomoxetine 40 mg capsule do to it being very costly. Pt also would like to know if she needs to continue taking hydroxyzine 25 mg tabs and fluvoxamine 50 mg tabs.      Best pt contact: [phone]  Ph[facility]acy: [facility] [address]   LOV: 3/23/22  NOV: nothing scheduled no showed last 2 appts

## 2022-05-06 NOTE — TELEPHONE ENCOUNTER
Spoke with pt and advised PCP note regarding Atomoxetine, pt stated she would prefer a non-stimulant ADHD treatment because the Adderall does not work well for her. Pt did state she is trying to contact insurance as well to see why they're not approving this medication. Pt verbalized understanding continuing her hydroxyzine and fluvoxamine.     Please advise

## 2022-05-06 NOTE — TELEPHONE ENCOUNTER
Atomoxetine is generic as far as I know. She may have to consider having the medication sent to another pharmacy. The reason why this medication was preferred to treat her ADHD is b/c it's a non-stimulant medication. She may also consider using Goodrx. If the medication is still too expensive then I can send in a low dose of Adderall XR (which is a stimulant medication) and have her RTC in 2 weeks after starting that. I would recommend that she continue her hydroxyzine and fluvoxamine. John Medina 177

## 2022-05-06 NOTE — TELEPHONE ENCOUNTER
Patient is calling back and wanting to see if another ADHD medication can be called in for her since insurance is having issues covering any at a decent price.   Pt would like to have [facility] [address] please call pt to advise @ [phone]

## 2022-05-09 RX ORDER — HYDROXYZINE HYDROCHLORIDE 25 MG/1
25 TABLET, FILM COATED ORAL EVERY 12 HOURS PRN
Qty: 60 TABLET | Refills: 0 | Status: SHIPPED | OUTPATIENT
Start: 2022-05-09 | End: 2022-06-17 | Stop reason: SDUPTHER

## 2022-05-09 RX ORDER — FLUVOXAMINE MALEATE 50 MG/1
50 TABLET, COATED ORAL 2 TIMES DAILY
Qty: 60 TABLET | Refills: 0 | Status: SHIPPED | OUTPATIENT
Start: 2022-05-09 | End: 2022-05-10 | Stop reason: SDUPTHER

## 2022-05-09 RX ORDER — ATOMOXETINE 40 MG/1
40 CAPSULE ORAL DAILY
Qty: 30 CAPSULE | Refills: 1 | Status: SHIPPED | OUTPATIENT
Start: 2022-05-09 | End: 2022-07-11 | Stop reason: SDUPTHER

## 2022-05-09 NOTE — TELEPHONE ENCOUNTER
Spoke with pt and she stated she talked to her insurance and they told her if we send Atomoxetine to [facility] and she uses the goodrx coupon a 1 month supply will be $30 so pt would just like to do that. Pt stated she paid the $100 something Friday for one month supply of the Atomoxetine because she needed it, so pt already has a 1 month supply. Pended med with updated [facility] pharmacy.     caroline- Pt has an appt with the gyn in our same [address] [place] [address] for 5/17

## 2022-05-10 DIAGNOSIS — F42.2 MIXED OBSESSIONAL THOUGHTS AND ACTS: ICD-10-CM

## 2022-05-10 DIAGNOSIS — F41.8 MIXED ANXIETY AND DEPRESSIVE DISORDER: ICD-10-CM

## 2022-05-10 DIAGNOSIS — F40.10 SOCIAL ANXIETY DISORDER: ICD-10-CM

## 2022-05-10 RX ORDER — FLUVOXAMINE MALEATE 100 MG
100 TABLET ORAL NIGHTLY
Qty: 30 TABLET | Refills: 0 | Status: SHIPPED | OUTPATIENT
Start: 2022-05-10 | End: 2022-07-11 | Stop reason: SDUPTHER

## 2022-05-10 NOTE — TELEPHONE ENCOUNTER
[facility] states insurance will only pay for Fluvoxamine maleate 50 mg I tablet daily    [facility] [phone]

## 2022-05-10 NOTE — TELEPHONE ENCOUNTER
Will have to change to 100 mg daily since she's taking 50 mg BID. Please inform patient. John Hubbard

## 2022-05-19 NOTE — TELEPHONE ENCOUNTER
Spoke with pt's , Hannah Martini, who is on HIPPA and advised of dose change. Hannah Martini verbalized understanding and stated he will let Thereasa Abide know.

## 2022-05-20 DIAGNOSIS — G25.81 RESTLESS LEGS SYNDROME (RLS): ICD-10-CM

## 2022-05-20 RX ORDER — PRAMIPEXOLE DIHYDROCHLORIDE 0.5 MG/1
0.5 TABLET ORAL NIGHTLY
Qty: 30 TABLET | Refills: 1 | OUTPATIENT
Start: 2022-05-20

## 2022-05-31 DIAGNOSIS — G47.9 SLEEP DISTURBANCES: ICD-10-CM

## 2022-05-31 DIAGNOSIS — F41.8 MIXED ANXIETY AND DEPRESSIVE DISORDER: ICD-10-CM

## 2022-05-31 RX ORDER — HYDROXYZINE HYDROCHLORIDE 25 MG/1
25 TABLET, FILM COATED ORAL EVERY 12 HOURS PRN
Qty: 60 TABLET | Refills: 0 | OUTPATIENT
Start: 2022-05-31 | End: 2022-06-30

## 2022-06-14 NOTE — PROGRESS NOTES
La Zamora   37 y.o. female   1980    HPI:    Virtual visit encounter type:   [x] Doxy. me  [] Telephone w/o video  [] MyChart  [] Other:     Patient was last seen by me on 3/23/2022. Reason(s) for visit:   Chief Complaint   Patient presents with    Concern For COVID-19     body aches,  temp 99.8,headache, eyes hurts, fatigue, sore throat,  tested positive on Monday.  Anxiety       Patient was seen today for issues noted below. Symptom duration:   [x]  3 day(s)  []  week(s)   [] None    Symptom course:  [x] Worsening       [] Improving    [] Stable/persistent  [] Fluctuating (waxing/waning)        [] Asymptomatic     Other historical relevant factors:  [x] Exposure to known sick contacts:  took rapid home test.  got Paxlovid from his PCP. [] Social gatherings:    [] Close contact with a lab confirmed COVID-19 patient within 14 days of symptom onset  [] History of travel from affected geographical areas within 14 days of symptom onset  [] Health care worker exposure w/ no symptoms  [] Health care worker exposure symptomatic    COVID-19 test:  [] Positive       [] PCR test      [] Rapid test  [] Negative      [] PCR test      [] Rapid test   [x] Not performed    Influenza test:   [] Positive   [] Negative  [] Not performed    COVID-19 vaccination status:  [x] Vaccinated - not \"boosted\"  [] Unvaccinated    Influenza vaccination status:  [] Vaccinated  [x] Unvaccinated    Immunization History   Administered Date(s) Administered    COVID-19, J&J, PF, 0.5 mL 09/16/2021    Tdap (Boostrix, Adacel) 02/21/2013       Symptoms:  Review of Systems   Constitutional: Positive for activity change, appetite change, fatigue (main issue) and fever (99.8-101). Negative for chills. HENT: Positive for sore throat. Negative for congestion, postnasal drip, rhinorrhea, sinus pressure and trouble swallowing.          Negative for disturbance in taste    negative for disturbance in smell     Eyes: Positive for pain. Respiratory: Positive for cough and chest tightness. Negative for shortness of breath and wheezing. Gastrointestinal: Positive for constipation. Negative for diarrhea, nausea and vomiting. Musculoskeletal: Positive for arthralgias (main issue) and myalgias. Skin: Negative for rash. Neurological: Negative for weakness, light-headedness and headaches. Psychiatric/Behavioral: Positive for decreased concentration. Negative for sleep disturbance.      [] Other noteworthy relevant history:      Medications used:  [] NSAID:             [x] Ibuprofen (Motrin) - last used:             [] Naproxen (Aleve) - last used:              [] Meloxicam (Mobic)             [] Celecoxib (Celebrex)    [] Tylenol - last used:   [] Anti-tussives -  [] Anti-emetics -  [] Nasal spray -  [] Decongestant -   [] Allergy medication -  [] Antibiotic -   [] Inhaler(s) -  [] Corticosteroids -   [] OTC -   [] Home remedies -    [] None    Medical risk factors:  [] Pregnant or possibly pregnant  [] Age 72 and older  [] Diabetes  [] Heart disease  [] Asthma  [] COPD/Other chronic lung diseases  [] Active cancer  [] On chemotherapy/immunosuppressive drugs  [] Currently or recent history of taking oral corticosteroids  [] History of lymphoma/leukemia  [] History of tobacco smoking  [] Current tobacco smoker  [] Obesity  [] Other:    Allergies   Allergen Reactions    Sulfa Antibiotics      Pt states that her throat closes up        Current Outpatient Medications on File Prior to Visit   Medication Sig Dispense Refill    fluvoxaMINE (LUVOX) 100 MG tablet Take 1 tablet by mouth nightly 30 tablet 0    atomoxetine (STRATTERA) 40 MG capsule Take 1 capsule by mouth daily 30 capsule 1    pramipexole (MIRAPEX) 0.5 MG tablet Take 1 tablet by mouth nightly 30 tablet 1    dicyclomine (BENTYL) 20 MG tablet Take 1 tablet by mouth every 8 hours as needed (abdominal cramping) 30 tablet 2    hydrOXYzine (ATARAX) 25 MG tablet Take 1 tablet by mouth every 12 hours as needed for Anxiety (insomnia) 60 tablet 0     No current facility-administered medications on file prior to visit. Family History   Problem Relation Age of Onset    Asthma Mother     Cancer Maternal Grandmother     High Blood Pressure Paternal Aunt     High Cholesterol Paternal Uncle     Parkinsonism Paternal Uncle     High Cholesterol Paternal Uncle     Stroke Maternal Aunt     High Blood Pressure Maternal Aunt     Other Maternal Aunt         Brain hemorrhage       Social History     Tobacco Use    Smoking status: Current Some Day Smoker     Packs/day: 0.50     Years: 10.00     Pack years: 5.00     Types: Cigarettes    Smokeless tobacco: Never Used   Substance Use Topics    Alcohol use: Not Currently        Lab Results   Component Value Date    WBC 6.2 03/23/2022    HGB 13.8 03/23/2022    HCT 41.4 03/23/2022    MCV 89.7 03/23/2022     03/23/2022       Chemistry        Component Value Date/Time     03/23/2022 1354    K 4.4 03/23/2022 1354     03/23/2022 1354    CO2 22 03/23/2022 1354    BUN 11 03/23/2022 1354    CREATININE 0.6 03/23/2022 1354        Component Value Date/Time    CALCIUM 9.4 03/23/2022 1354    ALKPHOS 66 03/23/2022 1354    AST 22 03/23/2022 1354    ALT 23 03/23/2022 1354    BILITOT <0.2 03/23/2022 1354          Lab Results   Component Value Date    ALT 23 03/23/2022    AST 22 03/23/2022    ALKPHOS 66 03/23/2022    BILITOT <0.2 03/23/2022     No results found for: LABA1C  No results found for: EAG    Review of systems:  As noted above. Otherwise, rest of ROS is negative.     Wt Readings from Last 3 Encounters:   03/23/22 183 lb 12.8 oz (83.4 kg)   02/17/22 180 lb 9.6 oz (81.9 kg)   01/24/22 178 lb 12.8 oz (81.1 kg)       BP Readings from Last 3 Encounters:   03/23/22 122/82   02/17/22 110/76   01/24/22 118/86       Pulse Readings from Last 3 Encounters:   03/23/22 97   02/17/22 110   01/24/22 91       There were no vitals taken for this visit. Physical Exam  Vitals reviewed. Constitutional:       General: She is awake. She is in acute distress. Appearance: Normal appearance. She is ill-appearing. HENT:      Head: Normocephalic and atraumatic. Right Ear: External ear normal.      Left Ear: External ear normal.      Nose: Nose normal.   Eyes:      General: No scleral icterus. Right eye: No discharge. Left eye: No discharge. Extraocular Movements: Extraocular movements intact. Conjunctiva/sclera: Conjunctivae normal.   Pulmonary:      Effort: Pulmonary effort is normal. No respiratory distress. Breath sounds: No stridor. No wheezing. Abdominal:      General: There is no distension. Musculoskeletal:         General: Normal range of motion. Cervical back: Normal range of motion. No erythema. Skin:     Coloration: Skin is not cyanotic, jaundiced or pale. Findings: No erythema. Neurological:      General: No focal deficit present. Mental Status: She is alert and oriented to person, place, and time. Mental status is at baseline. Psychiatric:         Attention and Perception: Attention and perception normal.         Mood and Affect: Mood and affect normal.         Speech: Speech normal.         Behavior: Behavior normal. Behavior is cooperative. Thought Content: Thought content normal.        Assessment/Plan:   [place] was seen today for concern for covid-19 and anxiety. Diagnoses and all orders for this visit:    Viral syndrome  -     COVID-19; Future  -     prochlorperazine (COMPAZINE) 10 MG tablet; Take 1 tablet by mouth every 8 hours as needed (nausea)  -     albuterol sulfate HFA (PROVENTIL;VENTOLIN;PROAIR) 108 (90 Base) MCG/ACT inhaler; Inhale 2 puffs into the lungs every 4-6 hours as needed for Wheezing or Shortness of Breath  -     brompheniramine-pseudoephedrine-DM 2-30-10 MG/5ML syrup;  Take 5 mLs by mouth every 6 hours as needed for Congestion or Cough    Exposure to confirmed case of COVID-19  -     COVID-19; Future    Educated about 2019 novel coronavirus infection      Discussion about:  [x] Supportive care - hydration with plenty of water and/or Gatorade/Powerade or nutritional shakes (e.g. Ensure, Boost, etc.)  [x] Monitor temperature and vital signs. [] Informed COVID-19 is a viral infection. Antibiotics are not recommended in the treatment of viral infections (routinely). [] Monoclonal antibody - patient vocalized understanding that this therapy was approved by FDA under [employer] and gave permission for approval to be scheduled an appointment for an infusion. [x] Paxlovid - only effective within 5 days of symptom onset and is meant for mild to moderate COVID-19 infection (outpatients with high risk of progression to severe illness). Recommendations:  [x] Advised patient to notify anyone that they have been in close contact with about their symptoms. [x] Recommended quarantining at least 5 days (per current CDC guidelines) from either day of exposure or from onset of symptoms. [x] Informed patient that having a COVID-19 test done prior to day 5 (of either exposure or symptom onset) could lead to a false negative result and that testing would be recommended (preferably with a PCR test) on day 5 or later. [] Recommended obtaining a pulse oximeter (if unavailable) to measure oxygen saturation. [x] Recommended using an incentive spirometer and deep breathing exercises to help prevent atelectasis. [] Patient was offered as needed medications (inhaler, anti-tussive, anti-emetic, etc.) but declined. Other:  [] Notification to work/school letter provided to patient. I reviewed the plan of care with the patient. Patient acknowledged understanding and agreed with plan of care overall. There are no discontinued medications.      General information on medications:  -When it comes to medications, whether with starting or adding a new medication or increasing the dose of a current medication, the benefits and risks have to always be considered and weighed over, especially if one is taking other medications as well. -There are no medications that have no side effects and that there is always a risk involved with taking a medication.    -If a side effect were to occur with starting a new medication or with increasing the dose of a current medication that either the medication can be totally discontinued altogether or simply decrease the dose of it and if this would be the case a follow-up appointment would be deemed necessary.    -The drug allergy list will then be updated with the corresponding side effect(s) if it's deemed to be a true 'drug allergy'. -The most common adverse effects of medication(s) were addressed at today's visit.    -Lastly, the coverage status of a medication may vary from insurance to insurance and the only way to verify if the medication is covered is to send an actual prescription in.    -The drug formulary of each insurance changes without any warning or notification to the healthcare provider let alone the pharmacy.  -The cost of medications vary from insurance to insurance and the cost is always subject to change just like the drug formulary. General disclaimer regarding telemedicine (virtual) visits:  Zeinab Sheppard is a 37 y.o. female is being evaluated by a virtual visit (video visit) and/or telephone (without video) encounter to address their concern(s) as mentioned above. Prior to arranging this appointment, the patient was made aware of the need and importance to schedule the visit in this manner given the current ongoing COVID-19 pandemic. The patient was also made aware that the telemedicine service used (e.g.doxy. me) would not save let alone record any information (audio, video, and text) regarding the actual virtual visit.   After this discussion, the patient acknowledged understanding and agreed to today's virtual visit encounter. A caregiver was present when appropriate as well as an  if requested. The patient is aware that telemedicine visits are a billable service just like an in person visit is. The patient was located in a state where the healthcare provider was licensed to provide care. Due to this being a TeleHealth encounter (during the OAAOB-87 public health emergency), evaluation of the following organ systems was overall limited: Vitals/Constitutional/EENT/Resp/CV/GI//MS/Neuro/Skin/Heme-Lymph-Imm. As a result, establishing a diagnosis(s) and formulating a plan of care may be overall more difficult and complicated compared to a conventional (face-to-face) office visit. The patient was made aware about the potential limitations and difficulties of a telemedicine visit such as having technical difficulties related to video and/or audio issues often stemming from a sub-optimal/poor Internet or cellular data connection and/or other factors. If this is judged to be the case then the patient would be informed if deemed relevant and necessary that an in-person follow-up visit may be recommended. Pursuant to the emergency declaration under the [address] [place] [address], [address] [place] [address] authority and the [employer] and [employer] Act, this virtual visit was conducted with the patient's (and/or legal guardian's) consent, to reduce the patient's risk (as well as others) of exposure to COVID-19 and to continue to maintain and provide necessary medical care. The patient (and/or legal guardian) has also been advised to contact this office for worsening conditions or problems, and seek emergency medical treatment and/or call 911 if deemed necessary. Follow-up: Return if symptoms worsen or fail to improve. .     Patient was informed that if his or her symptoms worsen to follow up with me sooner or go to the nearest ER if the symptoms are very significant and warrant higher level of care. Regarding my note:  -This note was composed (by me only and not with assistance via a scribe) to the best of my knowledge and recollection of the encounter with the patient using one of my own customized note templates utilizing a combination of typing and dictating with the [address] [place] [address] speech recognition software. As a result, the note may possibly contain various errors (e.g. spelling, grammar, and non-sensible words/phrases/statements) despite reviewing the note prior to signing it for completion. Time spent includes some or all of the following, both face-to-face time and non face-to-face time, but is not limited to:  [x] Preparing to see the patient by reviewing medical records available (notes, labs, imaging, etc.) prior to seeing the patient. [x] Obtaining and/or reviewing the history from the patient. [x] Performing a medically appropriate examination. [x] Ordering of relevant lab work, medications, referrals, or procedures. [x] Discussing patient's medical issues and formulating an assessment and plan. [x] Reviewing plan of care with patient. Answering any questions or concerns. [x] Documentation within the electronic health record (EHR)  [] Reviewing records of history relevant to patient's issues after seeing the patient. [] Discussion or coordination of care with other health care professionals  [x] Other:   -I spent a significant amount of time discussing various issues as noted above and also with formulating a treatment plan for each specific issue. Patient was given the opportunity to ask me any questions and address any concerns/issues. I also reviewed lab work (if available) as well as prior notes from PCP and/or other specialists if available. John Bernstein M.D.   [address]     Electronically signed by Bonny Fernandez MD JOHNS on 6/15/2022 at 8:52 AM.

## 2022-06-15 ENCOUNTER — TELEMEDICINE (OUTPATIENT)
Dept: FAMILY MEDICINE CLINIC | Age: 42
End: 2022-06-15
Payer: COMMERCIAL

## 2022-06-15 DIAGNOSIS — B34.9 VIRAL SYNDROME: ICD-10-CM

## 2022-06-15 DIAGNOSIS — Z20.822 EXPOSURE TO CONFIRMED CASE OF COVID-19: ICD-10-CM

## 2022-06-15 DIAGNOSIS — B34.9 VIRAL SYNDROME: Primary | ICD-10-CM

## 2022-06-15 DIAGNOSIS — Z71.89 EDUCATED ABOUT 2019 NOVEL CORONAVIRUS INFECTION: ICD-10-CM

## 2022-06-15 PROCEDURE — G8427 DOCREV CUR MEDS BY ELIG CLIN: HCPCS | Performed by: FAMILY MEDICINE

## 2022-06-15 PROCEDURE — 99214 OFFICE O/P EST MOD 30 MIN: CPT | Performed by: FAMILY MEDICINE

## 2022-06-15 RX ORDER — PROCHLORPERAZINE MALEATE 10 MG
10 TABLET ORAL EVERY 8 HOURS PRN
Qty: 15 TABLET | Refills: 1 | Status: SHIPPED | OUTPATIENT
Start: 2022-06-15

## 2022-06-15 RX ORDER — ALBUTEROL SULFATE 90 UG/1
2 AEROSOL, METERED RESPIRATORY (INHALATION)
Qty: 18 G | Refills: 1 | Status: SHIPPED | OUTPATIENT
Start: 2022-06-15

## 2022-06-15 RX ORDER — BROMPHENIRAMINE MALEATE, PSEUDOEPHEDRINE HYDROCHLORIDE, AND DEXTROMETHORPHAN HYDROBROMIDE 2; 30; 10 MG/5ML; MG/5ML; MG/5ML
5 SYRUP ORAL EVERY 6 HOURS PRN
Qty: 118 ML | Refills: 0 | Status: SHIPPED | OUTPATIENT
Start: 2022-06-15

## 2022-06-15 ASSESSMENT — ENCOUNTER SYMPTOMS
NAUSEA: 0
SORE THROAT: 1
RHINORRHEA: 0
TROUBLE SWALLOWING: 0
DIARRHEA: 0
VOMITING: 0
CONSTIPATION: 1
EYE PAIN: 1
CHEST TIGHTNESS: 1
WHEEZING: 0
SHORTNESS OF BREATH: 0
COUGH: 1
SINUS PRESSURE: 0

## 2022-06-15 NOTE — TELEPHONE ENCOUNTER
Patient called and needs qty 90 supply, because after 3 months of taking this medication on a month to month basis, [facility] is requesting qty of 90 only, pt states that she can't stop taking this each month while waiting to see if they are going to fill each month, and while waiting she gets more agitated and irritable until she does get back on her medication, pt uses [facility] on [address] in [facility].  Please call pt to advise @ [phone] ( - shanita victoriaden)

## 2022-06-16 DIAGNOSIS — U07.1 COVID-19 VIRUS INFECTION: Primary | ICD-10-CM

## 2022-06-16 LAB — SARS-COV-2: DETECTED

## 2022-06-17 RX ORDER — HYDROXYZINE HYDROCHLORIDE 25 MG/1
25 TABLET, FILM COATED ORAL EVERY 12 HOURS PRN
Qty: 180 TABLET | Refills: 0 | Status: SHIPPED | OUTPATIENT
Start: 2022-06-17 | End: 2022-07-18 | Stop reason: SDUPTHER
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
## 2022-06-23 DIAGNOSIS — S40.869A TICK BITE OF UPPER ARM, UNSPECIFIED LATERALITY, INITIAL ENCOUNTER: Primary | ICD-10-CM

## 2022-06-23 RX ORDER — TRIAMCINOLONE ACETONIDE 0.25 MG/G
OINTMENT TOPICAL
Qty: 15 G | Refills: 1 | Status: SHIPPED | OUTPATIENT
Start: 2022-06-23 | End: 2022-06-30

## 2022-06-29 DIAGNOSIS — F90.2 ATTENTION DEFICIT HYPERACTIVITY DISORDER (ADHD), COMBINED TYPE: ICD-10-CM

## 2022-06-29 RX ORDER — ATOMOXETINE 40 MG/1
CAPSULE ORAL
Qty: 30 CAPSULE | Refills: 3 | OUTPATIENT
Start: 2022-06-29

## 2022-07-01 ENCOUNTER — TELEPHONE (OUTPATIENT)
Dept: FAMILY MEDICINE CLINIC | Age: 42
End: 2022-07-01

## 2022-07-01 NOTE — TELEPHONE ENCOUNTER
is calling regarding coding for visits on 2/17 and 3/23. Pt and pt's  don't believe that those visits should have been coded at level 4 and level 5, respectively. Pt used to work in coding and doesn't believe that visits met qualifications to be billed at those levels. Please contact pt  [phone]. Pt's  also asked if there was anything we could do from the office to stop the disputed bills from going to collections.

## 2022-07-07 NOTE — TELEPHONE ENCOUNTER
Last medication refill as on 3/23. Pt needs an appt. Can send in a partial refill until appt. LM for pt to call the office to advise of this.

## 2022-07-07 NOTE — TELEPHONE ENCOUNTER
PT called and says she needs a refill on her medication, atomoxetine (STRATTERA) 40 MG capsule, says she was told she has to be seen before she gets a refill but has just taken her last pill today. PHARMACY: [facility] on [address] [facility] [address]      Last Refill: 05/09/22    Next OV: Unknown

## 2022-07-10 DIAGNOSIS — F41.8 MIXED ANXIETY AND DEPRESSIVE DISORDER: ICD-10-CM

## 2022-07-10 DIAGNOSIS — F42.2 MIXED OBSESSIONAL THOUGHTS AND ACTS: ICD-10-CM

## 2022-07-11 ENCOUNTER — NURSE TRIAGE (OUTPATIENT)
Dept: OTHER | Facility: CLINIC | Age: 42
End: 2022-07-11

## 2022-07-11 DIAGNOSIS — F42.2 MIXED OBSESSIONAL THOUGHTS AND ACTS: ICD-10-CM

## 2022-07-11 DIAGNOSIS — G25.81 RESTLESS LEGS SYNDROME (RLS): ICD-10-CM

## 2022-07-11 DIAGNOSIS — F40.10 SOCIAL ANXIETY DISORDER: ICD-10-CM

## 2022-07-11 DIAGNOSIS — F41.8 MIXED ANXIETY AND DEPRESSIVE DISORDER: ICD-10-CM

## 2022-07-11 DIAGNOSIS — F90.2 ATTENTION DEFICIT HYPERACTIVITY DISORDER (ADHD), COMBINED TYPE: ICD-10-CM

## 2022-07-11 RX ORDER — FLUVOXAMINE MALEATE 50 MG/1
50 TABLET, COATED ORAL NIGHTLY
Qty: 30 TABLET | Refills: 1 | OUTPATIENT
Start: 2022-07-11

## 2022-07-11 RX ORDER — FLUVOXAMINE MALEATE 100 MG
100 TABLET ORAL NIGHTLY
Qty: 8 TABLET | Refills: 0 | Status: SHIPPED | OUTPATIENT
Start: 2022-07-11 | End: 2022-07-18 | Stop reason: SDUPTHER

## 2022-07-11 RX ORDER — ATOMOXETINE 40 MG/1
40 CAPSULE ORAL DAILY
Qty: 8 CAPSULE | Refills: 0 | Status: SHIPPED | OUTPATIENT
Start: 2022-07-11 | End: 2022-07-18 | Stop reason: DRUGHIGH

## 2022-07-11 RX ORDER — PRAMIPEXOLE DIHYDROCHLORIDE 0.5 MG/1
0.5 TABLET ORAL NIGHTLY
Qty: 8 TABLET | Refills: 0 | Status: SHIPPED | OUTPATIENT
Start: 2022-07-11 | End: 2022-07-18 | Stop reason: SDUPTHER

## 2022-07-11 NOTE — TELEPHONE ENCOUNTER
Partials pending.     LRX: Mirapex 3/23/22 Strattera 5/9/22 Luvoc 5/10/22  LVV: 6/15/22  NOV: 7/18/22  Last lab: 3/23/22

## 2022-07-11 NOTE — TELEPHONE ENCOUNTER
Received call from [address] at [facility] with Red Flag Complaint. Subjective: Caller states \"needs a refill on her Strattera, Luvox, atarax, Mirapex and other meds. but cannot reach the PCP to get refills. \"     Current Symptoms: increased anxiety med refills, shakes or tremors    Onset: 1 week ago; sudden, rapid, worsening    Associated Symptoms: reduced activity, irritability , fussiness, increased sleepiness, increased wakefulness    Pain Severity: 0/10; N/A; none    Temperature: no n/a    What has been tried: nothing    LMP: NA Pregnant: NA    Recommended disposition: Call PCP when office is open    Care advice provided, patient verbalizes understanding; denies any other questions or concerns; instructed to call back for any new or worsening symptoms. Writer provided warm transfer to [facility] at [facility] for Increased Anxiety, med refills, out of Mental health meds for 1 week with tremors. Attention Provider: Thank you for allowing me to participate in the care of your patient. The patient was connected to triage in response to information provided to the ECC/PSC. Please do not respond through this encounter as the response is not directed to a shared pool.             Reason for Disposition   [1] Prescription refill request for ESSENTIAL medicine (i.e., likelihood of harm to patient if not taken) AND [2] triager unable to refill per department policy    Protocols used: MEDICATION REFILL AND RENEWAL CALL-ADULT-

## 2022-07-13 ENCOUNTER — TELEPHONE (OUTPATIENT)
Dept: FAMILY MEDICINE CLINIC | Age: 42
End: 2022-07-13

## 2022-07-13 DIAGNOSIS — F42.2 MIXED OBSESSIONAL THOUGHTS AND ACTS: ICD-10-CM

## 2022-07-13 DIAGNOSIS — F41.8 MIXED ANXIETY AND DEPRESSIVE DISORDER: ICD-10-CM

## 2022-07-13 DIAGNOSIS — F40.10 SOCIAL ANXIETY DISORDER: ICD-10-CM
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
## 2022-07-18 ENCOUNTER — OFFICE VISIT (OUTPATIENT)
Dept: FAMILY MEDICINE CLINIC | Age: 42
End: 2022-07-18
Payer: COMMERCIAL

## 2022-07-18 VITALS
OXYGEN SATURATION: 99 % | TEMPERATURE: 97.9 F | SYSTOLIC BLOOD PRESSURE: 118 MMHG | HEART RATE: 111 BPM | BODY MASS INDEX: 26.94 KG/M2 | DIASTOLIC BLOOD PRESSURE: 80 MMHG | WEIGHT: 179.8 LBS

## 2022-07-18 DIAGNOSIS — Z86.16 PERSONAL HISTORY OF COVID-19: ICD-10-CM

## 2022-07-18 DIAGNOSIS — F40.10 SOCIAL ANXIETY DISORDER: Primary | ICD-10-CM

## 2022-07-18 DIAGNOSIS — F90.2 ATTENTION DEFICIT HYPERACTIVITY DISORDER (ADHD), COMBINED TYPE: ICD-10-CM

## 2022-07-18 DIAGNOSIS — F41.8 MIXED ANXIETY AND DEPRESSIVE DISORDER: ICD-10-CM

## 2022-07-18 DIAGNOSIS — G47.9 SLEEP DISTURBANCES: ICD-10-CM

## 2022-07-18 DIAGNOSIS — F42.2 MIXED OBSESSIONAL THOUGHTS AND ACTS: ICD-10-CM

## 2022-07-18 DIAGNOSIS — G25.81 RESTLESS LEGS SYNDROME (RLS): ICD-10-CM

## 2022-07-18 PROCEDURE — 99214 OFFICE O/P EST MOD 30 MIN: CPT | Performed by: FAMILY MEDICINE

## 2022-07-18 RX ORDER — FLUVOXAMINE MALEATE 100 MG
100 TABLET ORAL NIGHTLY
Qty: 30 TABLET | Refills: 2 | Status: SHIPPED | OUTPATIENT
Start: 2022-07-18

## 2022-07-18 RX ORDER — ATOMOXETINE 40 MG/1
40 CAPSULE ORAL DAILY
Qty: 30 CAPSULE | Refills: 0 | Status: CANCELLED | OUTPATIENT
Start: 2022-07-18

## 2022-07-18 RX ORDER — HYDROXYZINE HYDROCHLORIDE 25 MG/1
25 TABLET, FILM COATED ORAL EVERY 12 HOURS PRN
Qty: 180 TABLET | Refills: 0 | Status: SHIPPED | OUTPATIENT
Start: 2022-07-18 | End: 2022-10-16

## 2022-07-18 RX ORDER — ATOMOXETINE 60 MG/1
60 CAPSULE ORAL DAILY
Qty: 15 CAPSULE | Refills: 0 | Status: SHIPPED | OUTPATIENT
Start: 2022-07-18

## 2022-07-18 RX ORDER — PRAMIPEXOLE DIHYDROCHLORIDE 0.5 MG/1
0.5 TABLET ORAL NIGHTLY
Qty: 90 TABLET | Refills: 1 | Status: SHIPPED | OUTPATIENT
Start: 2022-07-18

## 2022-07-18 ASSESSMENT — ENCOUNTER SYMPTOMS
BLOOD IN STOOL: 0
COUGH: 0
RHINORRHEA: 0
ABDOMINAL DISTENTION: 0
CHEST TIGHTNESS: 0
DIARRHEA: 0
BACK PAIN: 0
CONSTIPATION: 0
SINUS PRESSURE: 0
NAUSEA: 0
WHEEZING: 0
ABDOMINAL PAIN: 0
TROUBLE SWALLOWING: 0
SHORTNESS OF BREATH: 0
VOMITING: 0

## 2022-08-09 DIAGNOSIS — F41.8 MIXED ANXIETY AND DEPRESSIVE DISORDER: ICD-10-CM

## 2022-08-09 DIAGNOSIS — F42.2 MIXED OBSESSIONAL THOUGHTS AND ACTS: ICD-10-CM

## 2022-08-09 DIAGNOSIS — F40.10 SOCIAL ANXIETY DISORDER: ICD-10-CM

## 2022-08-09 RX ORDER — FLUVOXAMINE MALEATE 100 MG
100 TABLET ORAL NIGHTLY
Qty: 30 TABLET | Refills: 2 | OUTPATIENT
Start: 2022-08-09

## 2022-08-10 ENCOUNTER — TELEPHONE (OUTPATIENT)
Dept: ORTHOPEDIC SURGERY | Age: 42
End: 2022-08-10

## 2022-08-10 NOTE — TELEPHONE ENCOUNTER
Submitted PA for Atomoxetine HCl 60MG capsules  Via CM Key: [id number] STATUS: Your PA has been resolved, no PA is required. If this requires a response please respond to the pool ( [id number] [facility]). Thank you please advise patient.

## 2022-11-10 DIAGNOSIS — F90.2 ATTENTION DEFICIT HYPERACTIVITY DISORDER (ADHD), COMBINED TYPE: ICD-10-CM

## 2022-11-10 DIAGNOSIS — F42.2 MIXED OBSESSIONAL THOUGHTS AND ACTS: ICD-10-CM

## 2022-11-10 DIAGNOSIS — G47.9 SLEEP DISTURBANCES: ICD-10-CM

## 2022-11-10 DIAGNOSIS — F41.8 MIXED ANXIETY AND DEPRESSIVE DISORDER: ICD-10-CM

## 2022-11-10 DIAGNOSIS — F40.10 SOCIAL ANXIETY DISORDER: ICD-10-CM

## 2022-11-10 RX ORDER — ATOMOXETINE 60 MG/1
60 CAPSULE ORAL DAILY
Qty: 4 CAPSULE | Refills: 0 | Status: SHIPPED | OUTPATIENT
Start: 2022-11-10 | End: 2022-11-14 | Stop reason: SDUPTHER

## 2022-11-10 RX ORDER — FLUVOXAMINE MALEATE 100 MG
100 TABLET ORAL NIGHTLY
Qty: 4 TABLET | Refills: 0 | Status: SHIPPED | OUTPATIENT
Start: 2022-11-10 | End: 2022-11-14 | Stop reason: SDUPTHER

## 2022-11-10 RX ORDER — HYDROXYZINE HYDROCHLORIDE 25 MG/1
25 TABLET, FILM COATED ORAL EVERY 12 HOURS PRN
Qty: 8 TABLET | Refills: 0 | Status: SHIPPED | OUTPATIENT
Start: 2022-11-10 | End: 2022-11-14 | Stop reason: SDUPTHER

## 2022-11-12 NOTE — PROGRESS NOTES
Alex JuradoApoorvaBeni   37 y.o. female   1980    HPI:    Patient was last seen by me on 7/18/2022. During our last office visit:   -Strattera was increased from 40 to 60 mg once daily for treatment of ADHD. Reason(s) for visit:   Chief Complaint   Patient presents with    Medication Refill    Depression    Anxiety     Patient was accompanied by her  today. I have not seen him since the patient's first office visit with me. The majority of our time spent during our office visit today was regarding medical bills over medical coding of some office visits and lab work that was originally ordered by me after our last office visit in Feb 2022 and additional lab work ordered during our March 2022 office visit. Patient's  stated that he has tried contacting our office about this issue, but has gotten no response. Regarding the lab work ordered, I ordered ferritin, iron studies, and TSH and free T4 were ordered because those of risk factors for RLS, which I had suspect that patient has. I also ordered Mg and Vit D because of patient's complaint of bilateral leg paresthesia. In March 2022, patient reported of issues of dysmenorrhea so I ordered [address] and LH testing at the request of patient for \"hormone workup\". I also ordered CBC, transvaginal U/S, and referred pt to [facility]. Patient informed me that she recently had a visit with [facility] and she was told that she has abnormal pap smear, which warrants further workup. No records were sent to be from her GYN office. Patient is mainly followed by me for issues of anxiety/depression, OCD issues, and ADHD. She was previously on Fluoxetine prior to establishing care with me. I switched her to Fluoxetine in May 2022 and she has been on her present dose of 100 mg once daily since then. Also in May 2022, I started her on Atomoxetine for ADHD at 40 mg once daily and she has been on 60 mg once daily since July 2022.   As a result of taking these medications, patient stated overall that they have helped her function at her best.  She's able to cope and manage her emotions better. She stated that she's able to concentrate for a longer period and retains things. Her  has noticed a profound difference as well. She denied any side effects with being on these medications. Mood disorder:  -Meds tried: Sertraline, Fluoxetine, Escitalopram (made her very tired), Paroxetine, Fluvoxamine.  -Personal history: issues with anxiety/depression since as far back as 12years old or even younger. -[address]: - depression runs strongly on both sides of family  -Issues of OCD such as cleaning.  -Has described herself as a \"severe perfectionist.\"  -Occupation: works on arts & crafts  -GeneSight test has not been done. Able to keep more organized thoughts  Feels good  Not having it Strattera - I don't care about antying      Restless legs:  -Meds tried: Pramipexole     ADD:  -Meds tried: Strattera     Updates:  -Patient has noticed improvement in her concentration and able to recall having conversations with people at times that she had issues with palpitations. Allergies   Allergen Reactions    Sulfa Antibiotics      Pt states that her throat closes up        Current Outpatient Medications on File Prior to Visit   Medication Sig Dispense Refill    prochlorperazine (COMPAZINE) 10 MG tablet Take 1 tablet by mouth every 8 hours as needed (nausea) 15 tablet 1    albuterol sulfate HFA (PROVENTIL;VENTOLIN;PROAIR) 108 (90 Base) MCG/ACT inhaler Inhale 2 puffs into the lungs every 4-6 hours as needed for Wheezing or Shortness of Breath (Patient not taking: Reported on 11/14/2022) 18 g 1    dicyclomine (BENTYL) 20 MG tablet Take 1 tablet by mouth every 8 hours as needed (abdominal cramping) 30 tablet 2     No current facility-administered medications on file prior to visit.         Family History   Problem Relation Age of Onset    Asthma Mother Cancer Maternal Grandmother     High Blood Pressure Paternal Aunt     High Cholesterol Paternal Uncle     Parkinsonism Paternal Uncle     High Cholesterol Paternal Uncle     Stroke Maternal Aunt     High Blood Pressure Maternal Aunt     Other Maternal Aunt         Brain hemorrhage       Social History     Tobacco Use    Smoking status: Former     Packs/day: 0.50     Years: 10.00     Pack years: 5.00     Types: Cigarettes    Smokeless tobacco: Never   Substance Use Topics    Alcohol use: Not Currently        Lab Results   Component Value Date    WBC 6.2 03/23/2022    HGB 13.8 03/23/2022    HCT 41.4 03/23/2022    MCV 89.7 03/23/2022     03/23/2022         Chemistry        Component Value Date/Time     03/23/2022 1354    K 4.4 03/23/2022 1354     03/23/2022 1354    CO2 22 03/23/2022 1354    BUN 11 03/23/2022 1354    CREATININE 0.6 03/23/2022 1354        Component Value Date/Time    CALCIUM 9.4 03/23/2022 1354    ALKPHOS 66 03/23/2022 1354    AST 22 03/23/2022 1354    ALT 23 03/23/2022 1354    BILITOT <0.2 03/23/2022 1354          Lab Results   Component Value Date    ALT 23 03/23/2022    AST 22 03/23/2022    ALKPHOS 66 03/23/2022    BILITOT <0.2 03/23/2022       Lab Results   Component Value Date    CHOL 203 (H) 12/22/2016    CHOL 166 05/07/2015    CHOL 151 11/04/2013     Lab Results   Component Value Date    TRIG 83 12/22/2016    TRIG 77 05/07/2015    TRIG 73 11/04/2013     Lab Results   Component Value Date    HDL 47 12/22/2016    HDL 39 (L) 05/07/2015    HDL 35 (L) 11/04/2013     Lab Results   Component Value Date    LDLCALC 139 (H) 12/22/2016    LDLCALC 112 (H) 05/07/2015    LDLCALC 101 (H) 11/04/2013     Lab Results   Component Value Date    LABVLDL 17 12/22/2016    LABVLDL 15 05/07/2015    LABVLDL 15 11/04/2013     Lab Results   Component Value Date    CHOLHDLRATIO 4.5 09/19/2012       Review of Systems   Constitutional:  Negative for activity change, appetite change, fatigue, fever and Trachea: Phonation normal.   Cardiovascular:      Rate and Rhythm: Tachycardia present. Pulmonary:      Effort: Pulmonary effort is normal.      Breath sounds: No stridor. Abdominal:      General: Abdomen is flat. There is no distension. Musculoskeletal:         General: No deformity. Normal range of motion. Cervical back: Normal range of motion. No erythema. Right lower leg: No edema. Left lower leg: No edema. Skin:     Coloration: Skin is not cyanotic, jaundiced or pale. Findings: No abrasion, abscess, bruising, ecchymosis, erythema, signs of injury, laceration, lesion, petechiae, rash or wound. Neurological:      General: No focal deficit present. Mental Status: She is alert. Mental status is at baseline. GCS: GCS eye subscore is 4. GCS verbal subscore is 5. GCS motor subscore is 6. Cranial Nerves: No cranial nerve deficit, dysarthria or facial asymmetry. Motor: No weakness, tremor, atrophy or seizure activity. Coordination: Coordination normal.      Gait: Gait is intact. Psychiatric:         Attention and Perception: Attention and perception normal.         Mood and Affect: Mood and affect normal.         Speech: Speech normal.         Behavior: Behavior normal. Behavior is cooperative. Thought Content: Thought content normal.        Assessment/Plan:   Sunbright was seen today for medication refill, depression and anxiety. Diagnoses and all orders for this visit:    Mixed anxiety and depressive disorder  Comments:  Stable on Fluvoxamine. Pt vocalized no desire to change her medical regiment. Orders:  -     fluvoxaMINE (LUVOX) 100 MG tablet; Take 1 tablet by mouth nightly  -     hydrOXYzine HCl (ATARAX) 25 MG tablet; Take 1 tablet by mouth every 12 hours as needed for Anxiety (insomnia)    Mixed obsessional thoughts and acts  Comments:  Stable on Fluvoxamine. Pt vocalized no desire to change her medical regiment.   Orders:  -     fluvoxaMINE (Hever Pilar) 100 MG tablet; Take 1 tablet by mouth nightly    Social anxiety disorder  Comments:  Stable on Fluvoxamine. Pt vocalized no desire to change her medical regiment. Orders:  -     fluvoxaMINE (LUVOX) 100 MG tablet; Take 1 tablet by mouth nightly    Attention deficit hyperactivity disorder (ADHD), combined type  Comments:  Stable on Strattera. She declined to increase the dose or make any changes to her medical regiment. Orders:  -     atomoxetine (STRATTERA) 60 MG capsule; Take 1 capsule by mouth daily    Sleep disturbances  Comments:  Stable on hydroxyzine 25 mg. Orders:  -     hydrOXYzine HCl (ATARAX) 25 MG tablet; Take 1 tablet by mouth every 12 hours as needed for Anxiety (insomnia)    Restless legs syndrome (RLS)  Comments:  Stable on Pramipexole. Orders:  -     pramipexole (MIRAPEX) 0.5 MG tablet; Take 1 tablet by mouth nightly    Other:   -90 day supply of meds written as requested by patient.    -I discussed with patient and her  regarding how I generally approach seeing patients, especially with prescribing a medication(s) (particularly when dealing with mental health issues). I generally see patients for follow up in 4-6 weeks and no more than 3 months. I informed her and her  that 4-6 week period is enough time to see if a medication(s) is effective and also enough time to see if there are any side effects to develop.     -I offered apologies to both patient and patient's  regarding communication from our office. I also discussed briefly about medical coding. I referred patient to my LPN for assistance with medical bills. I also recommended patient and her  to speak to financial department about financial assistance of smoke kind.    -I informed patient that generally I would like to see her given her medical issues every 3 months, but no later than 6 months. I reviewed the plan of care with the patient.  Patient acknowledged understanding and agreed with plan of care overall. Medications Discontinued During This Encounter   Medication Reason    brompheniramine-pseudoephedrine-DM 2-30-10 MG/5ML syrup LIST CLEANUP    pramipexole (MIRAPEX) 0.5 MG tablet REORDER    fluvoxaMINE (LUVOX) 100 MG tablet REORDER    hydrOXYzine HCl (ATARAX) 25 MG tablet REORDER    atomoxetine (STRATTERA) 60 MG capsule REORDER        General information on medications:  -When it comes to medications, whether with starting or adding a new medication or increasing the dose of a current medication, the benefits and risks have to always be considered and weighed over, especially if one is taking other medications as well. -There are no medications that have no side effects and that there is always a risk involved with taking a medication.    -If a side effect were to occur with starting a new medication or with increasing the dose of a current medication that either the medication can be totally discontinued altogether or simply decrease the dose of it and if this would be the case a follow-up appointment would be deemed necessary.    -The drug allergy list will then be updated with the corresponding side effect(s) if it's deemed to be a true 'drug allergy'. -The most common adverse effects of medication(s) were addressed at today's visit.    -Lastly, the coverage status of a medication may vary from insurance to insurance and the only way to verify if the medication is covered is to send an actual prescription in.    -The drug formulary of each insurance changes without any warning or notification to the healthcare provider let alone the pharmacy.  -The cost of medications vary from insurance to insurance and the cost is always subject to change just like the drug formulary. Follow-up: in 3 months, but no later than 6 months.     Patient was informed that if his or her symptoms worsen to follow up with me sooner or go to the nearest ER if the symptoms are very significant and warrant higher level of care.    Regarding my note:  -This note was composed (by me only and not with assistance via a scribe) to the best of my knowledge and recollection of the encounter with the patient using one of my own customized note templates utilizing a combination of typing and dictating with the [address] [place] [address] speech recognition software. As a result, the note may possibly contain various errors (e.g. spelling, grammar, and non-sensible words/phrases/statements) despite reviewing the note prior to signing it for completion. Time spent includes some or all of the following, both face-to-face time and non face-to-face time, but is not limited to:  [x] Preparing to see the patient by reviewing medical records available (notes, labs, imaging, etc.) prior to seeing the patient. [x] Obtaining and/or reviewing the history from the patient. [x] Performing a medically appropriate examination. [x] Ordering of relevant lab work, medications, referrals, or procedures. [x] Discussing patient's medical issues and formulating an assessment and plan. [x] Reviewing plan of care with patient. Answering any questions or concerns. [x] Documentation within the electronic health record (EHR)  [] Reviewing records of history relevant to patient's issues after seeing the patient. [] Discussion or coordination of care with other health care professionals  [x] Other: length office visit - 20 minutes.  -I spent a significant amount of time discussing various issues as noted above and also with formulating a treatment plan for each specific issue. Patient was given the opportunity to ask me any questions and address any concerns/issues. I also reviewed lab work (if available) as well as prior notes from PCP and/or other specialists if available. John Forrest M.D.   [address] [place] [address]    Electronically signed by Kody Beckwith MD M.D. on 11/15/2022 at 3:16 PM.

## 2022-11-14 ENCOUNTER — OFFICE VISIT (OUTPATIENT)
Dept: FAMILY MEDICINE CLINIC | Age: 42
End: 2022-11-14
Payer: COMMERCIAL

## 2022-11-14 VITALS
BODY MASS INDEX: 28.2 KG/M2 | DIASTOLIC BLOOD PRESSURE: 82 MMHG | HEART RATE: 106 BPM | WEIGHT: 188.2 LBS | SYSTOLIC BLOOD PRESSURE: 116 MMHG | OXYGEN SATURATION: 98 % | TEMPERATURE: 97.5 F

## 2022-11-14 DIAGNOSIS — F90.2 ATTENTION DEFICIT HYPERACTIVITY DISORDER (ADHD), COMBINED TYPE: ICD-10-CM

## 2022-11-14 DIAGNOSIS — F41.8 MIXED ANXIETY AND DEPRESSIVE DISORDER: ICD-10-CM

## 2022-11-14 DIAGNOSIS — G25.81 RESTLESS LEGS SYNDROME (RLS): ICD-10-CM

## 2022-11-14 DIAGNOSIS — G47.9 SLEEP DISTURBANCES: ICD-10-CM

## 2022-11-14 DIAGNOSIS — F40.10 SOCIAL ANXIETY DISORDER: ICD-10-CM

## 2022-11-14 DIAGNOSIS — F42.2 MIXED OBSESSIONAL THOUGHTS AND ACTS: ICD-10-CM

## 2022-11-14 PROCEDURE — 99213 OFFICE O/P EST LOW 20 MIN: CPT | Performed by: FAMILY MEDICINE

## 2022-11-14 PROCEDURE — 1036F TOBACCO NON-USER: CPT | Performed by: FAMILY MEDICINE

## 2022-11-14 PROCEDURE — G8427 DOCREV CUR MEDS BY ELIG CLIN: HCPCS | Performed by: FAMILY MEDICINE

## 2022-11-14 PROCEDURE — G8419 CALC BMI OUT NRM PARAM NOF/U: HCPCS | Performed by: FAMILY MEDICINE

## 2022-11-14 PROCEDURE — G8484 FLU IMMUNIZE NO ADMIN: HCPCS | Performed by: FAMILY MEDICINE

## 2022-11-14 RX ORDER — ATOMOXETINE 60 MG/1
60 CAPSULE ORAL DAILY
Qty: 90 CAPSULE | Refills: 1 | Status: SHIPPED | OUTPATIENT
Start: 2022-11-14

## 2022-11-14 RX ORDER — HYDROXYZINE HYDROCHLORIDE 25 MG/1
25 TABLET, FILM COATED ORAL EVERY 12 HOURS PRN
Qty: 90 TABLET | Refills: 1 | Status: SHIPPED | OUTPATIENT
Start: 2022-11-14 | End: 2023-02-12

## 2022-11-14 RX ORDER — PRAMIPEXOLE DIHYDROCHLORIDE 0.5 MG/1
0.5 TABLET ORAL NIGHTLY
Qty: 90 TABLET | Refills: 1 | Status: SHIPPED | OUTPATIENT
Start: 2022-11-14

## 2022-11-14 RX ORDER — FLUVOXAMINE MALEATE 100 MG
100 TABLET ORAL NIGHTLY
Qty: 90 TABLET | Refills: 1 | Status: SHIPPED | OUTPATIENT
Start: 2022-11-14

## 2022-11-15 ASSESSMENT — ENCOUNTER SYMPTOMS
TROUBLE SWALLOWING: 0
ABDOMINAL PAIN: 0
WHEEZING: 0
DIARRHEA: 0
RHINORRHEA: 0
SHORTNESS OF BREATH: 0
BLOOD IN STOOL: 0
ABDOMINAL DISTENTION: 0
COUGH: 0
VOMITING: 0
BACK PAIN: 0
CONSTIPATION: 0
SINUS PRESSURE: 0
NAUSEA: 0
CHEST TIGHTNESS: 0

## 2022-12-29 ENCOUNTER — TELEMEDICINE (OUTPATIENT)
Dept: PRIMARY CARE CLINIC | Age: 42
End: 2022-12-29
Payer: COMMERCIAL

## 2022-12-29 DIAGNOSIS — R11.2 NAUSEA AND VOMITING, UNSPECIFIED VOMITING TYPE: ICD-10-CM

## 2022-12-29 DIAGNOSIS — J06.9 VIRAL URI: Primary | ICD-10-CM

## 2022-12-29 PROCEDURE — G8427 DOCREV CUR MEDS BY ELIG CLIN: HCPCS | Performed by: NURSE PRACTITIONER

## 2022-12-29 PROCEDURE — 99213 OFFICE O/P EST LOW 20 MIN: CPT | Performed by: NURSE PRACTITIONER

## 2022-12-29 RX ORDER — ONDANSETRON 4 MG/1
4 TABLET, ORALLY DISINTEGRATING ORAL 3 TIMES DAILY PRN
Qty: 21 TABLET | Refills: 0 | Status: SHIPPED | OUTPATIENT
Start: 2022-12-29

## 2022-12-29 ASSESSMENT — ENCOUNTER SYMPTOMS
RHINORRHEA: 1
SORE THROAT: 1
VOMITING: 1
NAUSEA: 1

## 2022-12-29 NOTE — PROGRESS NOTES
Samantha Craig (:  1980) is a Established patient, here for evaluation of the following:    URI (Ear pain and sinus congestion, nausea x 3 days)       Assessment & Plan:  Below is the assessment and plan developed based on review of pertinent history, physical exam, labs, studies, and medications. 1. Viral URI  2. Nausea and vomiting, unspecified vomiting type  -     ondansetron (ZOFRAN-ODT) 4 MG disintegrating tablet; Take 1 tablet by mouth 3 times daily as needed for Nausea or Vomiting, Disp-21 tablet, R-0Normal  Return if symptoms worsen or fail to improve. Suspect this is covid or flu. Advised to take a home covid test and if negative, a flu test tomorrow at her pcp's office. Subjective: no home covid test completed yet  URI   This is a new problem. Episode onset: 3 days ago. There has been no fever (97.3). Associated symptoms include congestion, ear pain, headaches, nausea, rhinorrhea, a sore throat and vomiting. Review of Systems   Constitutional:  Positive for chills, diaphoresis and fatigue. Negative for fever. HENT:  Positive for congestion, ear pain, postnasal drip, rhinorrhea and sore throat. Gastrointestinal:  Positive for nausea and vomiting. Musculoskeletal:  Positive for myalgias. Neurological:  Positive for headaches.      Objective:  Patient-Reported Vitals  No data recorded     Patient-Reported Vitals 2022   Patient-Reported Weight 175   Patient-Reported Height 5'8 1/2\"   Patient-Reported Systolic -   Patient-Reported Diastolic -   Patient-Reported Pulse -   Patient-Reported Temperature 97.3        Physical Exam:  [INSTRUCTIONS:  \"[x]\" Indicates a positive item  \"[]\" Indicates a negative item  -- DELETE ALL ITEMS NOT EXAMINED]    Constitutional: [x] Appears well-developed and well-nourished [x] No apparent distress      [] Abnormal -     Mental status: [x] Alert and awake  [x] Oriented to person/place/time [x] Able to follow commands    [] Abnormal -     Eyes: EOM    [x]  Normal    [] Abnormal -   Sclera  [x]  Normal    [] Abnormal -          Discharge [x]  None visible   [] Abnormal -     HENT: [x] Normocephalic, atraumatic  [] Abnormal -   [x] Mouth/Throat: Mucous membranes are moist    External Ears [x] Normal  [] Abnormal -    Neck: [x] No visualized mass [] Abnormal -     Pulmonary/Chest: [x] Respiratory effort normal   [x] No visualized signs of difficulty breathing or respiratory distress        [] Abnormal -      Musculoskeletal:   [] Normal gait with no signs of ataxia         [x] Normal range of motion of neck        [] Abnormal -     Neurological:        [x] No Facial Asymmetry (Cranial nerve 7 motor function) (limited exam due to video visit)          [x] No gaze palsy        [] Abnormal -          Skin:        [x] No significant exanthematous lesions or discoloration noted on facial skin         [] Abnormal -            Psychiatric:       [x] Normal Affect [] Abnormal -        [] No Hallucinations    Other pertinent observable physical exam findings:-        On this date 12/29/2022 I have spent 15 minutes reviewing previous notes, test results and face to face (virtual) with the patient discussing the diagnosis and importance of compliance with the treatment plan as well as documenting on the day of the visit. Ottoniel [place], was evaluated through a synchronous (real-time) audio-video encounter. The patient (or guardian if applicable) is aware that this is a billable service, which includes applicable co-pays. This Virtual Visit was conducted with patient's (and/or legal guardian's) consent. The visit was conducted pursuant to the emergency declaration under the [address] [place] [address], [address] [place] [address] authority and the [employer] and [employer] General Act. Patient identification was verified, and a caregiver was present when appropriate.    The patient was located at Home: [place] [id number] [address] [address].    Provider was located at Home ([facility]): [address] [facility], [facility]

## 2022-12-30 ENCOUNTER — OFFICE VISIT (OUTPATIENT)
Dept: FAMILY MEDICINE CLINIC | Age: 42
End: 2022-12-30
Payer: COMMERCIAL

## 2022-12-30 ENCOUNTER — TELEPHONE (OUTPATIENT)
Dept: FAMILY MEDICINE CLINIC | Age: 42
End: 2022-12-30

## 2022-12-30 VITALS
OXYGEN SATURATION: 99 % | BODY MASS INDEX: 28.52 KG/M2 | SYSTOLIC BLOOD PRESSURE: 126 MMHG | TEMPERATURE: 98.4 F | HEIGHT: 68 IN | WEIGHT: 188.2 LBS | DIASTOLIC BLOOD PRESSURE: 92 MMHG | HEART RATE: 92 BPM | RESPIRATION RATE: 18 BRPM

## 2022-12-30 DIAGNOSIS — R05.1 ACUTE COUGH: ICD-10-CM

## 2022-12-30 DIAGNOSIS — R42 VERTIGO: ICD-10-CM

## 2022-12-30 DIAGNOSIS — J01.90 ACUTE BACTERIAL SINUSITIS: Primary | ICD-10-CM

## 2022-12-30 DIAGNOSIS — J11.1 INFLUENZA-LIKE ILLNESS: ICD-10-CM

## 2022-12-30 DIAGNOSIS — R50.9 FEVER, UNSPECIFIED FEVER CAUSE: ICD-10-CM

## 2022-12-30 DIAGNOSIS — B96.89 ACUTE BACTERIAL SINUSITIS: Primary | ICD-10-CM

## 2022-12-30 LAB
INFLUENZA A ANTIBODY: NORMAL
INFLUENZA B ANTIBODY: NORMAL

## 2022-12-30 PROCEDURE — G8419 CALC BMI OUT NRM PARAM NOF/U: HCPCS | Performed by: CLINICAL NURSE SPECIALIST

## 2022-12-30 PROCEDURE — 99214 OFFICE O/P EST MOD 30 MIN: CPT | Performed by: CLINICAL NURSE SPECIALIST

## 2022-12-30 PROCEDURE — G8427 DOCREV CUR MEDS BY ELIG CLIN: HCPCS | Performed by: CLINICAL NURSE SPECIALIST

## 2022-12-30 PROCEDURE — 1036F TOBACCO NON-USER: CPT | Performed by: CLINICAL NURSE SPECIALIST

## 2022-12-30 PROCEDURE — 87804 INFLUENZA ASSAY W/OPTIC: CPT | Performed by: CLINICAL NURSE SPECIALIST

## 2022-12-30 PROCEDURE — G8484 FLU IMMUNIZE NO ADMIN: HCPCS | Performed by: CLINICAL NURSE SPECIALIST

## 2022-12-30 RX ORDER — BENZONATATE 100 MG/1
100 CAPSULE ORAL 3 TIMES DAILY PRN
Qty: 30 CAPSULE | Refills: 0 | Status: SHIPPED | OUTPATIENT
Start: 2022-12-30 | End: 2023-01-06

## 2022-12-30 RX ORDER — MECLIZINE HYDROCHLORIDE 25 MG/1
25 TABLET ORAL 3 TIMES DAILY PRN
Qty: 30 TABLET | Refills: 0 | Status: SHIPPED | OUTPATIENT
Start: 2022-12-30 | End: 2023-01-09

## 2022-12-30 RX ORDER — AZITHROMYCIN 250 MG/1
250 TABLET, FILM COATED ORAL SEE ADMIN INSTRUCTIONS
Qty: 6 TABLET | Refills: 0 | Status: SHIPPED | OUTPATIENT
Start: 2022-12-30 | End: 2023-01-04

## 2022-12-30 RX ORDER — FLUTICASONE PROPIONATE 50 MCG
2 SPRAY, SUSPENSION (ML) NASAL DAILY
Qty: 1 EACH | Refills: 1 | Status: SHIPPED | OUTPATIENT
Start: 2022-12-30

## 2022-12-30 ASSESSMENT — ENCOUNTER SYMPTOMS
NAUSEA: 1
SHORTNESS OF BREATH: 0
SORE THROAT: 0
ABDOMINAL PAIN: 0
COUGH: 1
SINUS PAIN: 1
VOMITING: 1
RHINORRHEA: 1
CHEST TIGHTNESS: 0
CONSTIPATION: 0

## 2022-12-30 NOTE — PATIENT INSTRUCTIONS
Antibiotic as directed. Flonase 1-2 puffs to each nostril daily or twice daily    Prescription cough medication 2-3 times a day as needed for cough, may cause drowsiness. Cepacol Lozenges as needed for sore throat. Tylenol and or Motrin as needed/directed for pain and fever. Encourage vitam C, vitamin D and Zinc to boost immunity    Encourage rest and fluids.       Small frequent sips and snacks/meals    Keep diet bland avoid spicy and greasy foods    BRAT diet (bananas, rice, applesauce and toast)    Advance as tolerated (scrambled eggs, baked chicken)    To the ER for increased pain, fever, inability to hold down food and fluids    Meclizine 12.5 to 25 mg (half to full tab) 3 time daily as needed for dizziness, may cause drowsiness    Discussed vertigo exercises, information given    Call/follow up if symptoms worsen or persist

## 2022-12-30 NOTE — TELEPHONE ENCOUNTER
Patient states feeling weak, extreme fatigue, dizzy, ear pain, headache, sinus pressure no drainage, productive cough clear green, nausea. Not taking any OTC.   Covid negative  Symptoms x 2 weeks

## 2022-12-30 NOTE — PROGRESS NOTES
SUBJECTIVE:    Patient ID:  Castillo Ross is a 40 y.o. female      Patient is here for an acute visit for concerns of URI symptoms for 2 weeks, which has gotten worse over the last 3 days. States she is recently , states it was amicable. URI   This is a new problem. Episode onset: 2 weeks. The problem has been gradually worsening (3 days). Associated symptoms include congestion, coughing (productive), ear pain, headaches (sinus), nausea, a plugged ear sensation, rhinorrhea, sinus pain and vomiting. Pertinent negatives include no abdominal pain, rash, sneezing or sore throat. She has tried acetaminophen (Tylenol sinus, Mucinex) for the symptoms. Current Outpatient Medications on File Prior to Visit   Medication Sig Dispense Refill    ondansetron (ZOFRAN-ODT) 4 MG disintegrating tablet Take 1 tablet by mouth 3 times daily as needed for Nausea or Vomiting 21 tablet 0    atomoxetine (STRATTERA) 60 MG capsule Take 1 capsule by mouth daily 90 capsule 1    fluvoxaMINE (LUVOX) 100 MG tablet Take 1 tablet by mouth nightly 90 tablet 1    hydrOXYzine HCl (ATARAX) 25 MG tablet Take 1 tablet by mouth every 12 hours as needed for Anxiety (insomnia) 90 tablet 1    pramipexole (MIRAPEX) 0.5 MG tablet Take 1 tablet by mouth nightly 90 tablet 1    prochlorperazine (COMPAZINE) 10 MG tablet Take 1 tablet by mouth every 8 hours as needed (nausea) 15 tablet 1    albuterol sulfate HFA (PROVENTIL;VENTOLIN;PROAIR) 108 (90 Base) MCG/ACT inhaler Inhale 2 puffs into the lungs every 4-6 hours as needed for Wheezing or Shortness of Breath (Patient not taking: No sig reported) 18 g 1    dicyclomine (BENTYL) 20 MG tablet Take 1 tablet by mouth every 8 hours as needed (abdominal cramping) 30 tablet 2     No current facility-administered medications on file prior to visit.       Past Medical History:   Diagnosis Date    Acute sinusitis 06/24/2013    Acute sinusitis 06/24/2013    ADHD (attention deficit hyperactivity disorder)     Anxiety     ALEXSANDRA II (cervical intraepithelial neoplasia II) 4/21/09;10/2012    Dr. Matta([facility]N):[facility]. Repeat pap advised 10/22/09:nml w/next screenings yrly advised. Depression     Gastroesophageal reflux disease without esophagitis     Hiatal hernia:small 06/27/2017    HPV test positive 11/04/2013    Repeat pap smear & HPV testing due 11/4/14    Hypercholesteremia     Hyperlipidemia     Hypertension 2006    Insomnia 08/19/2015    Irritable bowel syndrome     LGSIL (low grade squamous intraepithelial lesion) on Pap smear 02/11/2009    Low HDL (under 40) 10/09/2012    Lumbar back pain 02/14/2019    Migraine     Panic attack 09/18/2012    Pap smear 2007;11/30/2011;11/2013;11/25/15;3/2020    11/25/15=nml.  Next due 11/2014. 2007-prior abnml paps pt unsure of dx, 2/11/2009 LGSIL pap colposcopy 4/21/2009 with bx -CIN1-2, per  gyn Dr Tata Collier observation with repeat pap sched 10/22/09,    Preeclampsia 2006    Pregnancy    Renal stone:left 06/27/2017    Restless legs syndrome     Ulcer aphthous oral 12/28/2010    Urinary incontinence 09/28/2017    Uterine leiomyoma 09/28/2017     Past Surgical History:   Procedure Laterality Date    FINGER SURGERY Left      Family History   Problem Relation Age of Onset    Asthma Mother     Cancer Maternal Grandmother     High Blood Pressure Paternal Aunt     High Cholesterol Paternal Uncle     Parkinsonism Paternal Uncle     High Cholesterol Paternal Uncle     Stroke Maternal Aunt     High Blood Pressure Maternal Aunt     Other Maternal Aunt         Brain hemorrhage     Social History     Socioeconomic History    Marital status:      Spouse name: Not on file    Number of children: Not on file    Years of education: Not on file    Highest education level: Not on file   Occupational History    Occupation: [address]   Tobacco Use    Smoking status: Former     Packs/day: 0.50     Years: 10.00     Pack years: 5.00     Types: Cigarettes    Smokeless tobacco: Never   Vaping Use    Vaping Use: Never used   Substance and Sexual Activity    Alcohol use: Not Currently    Drug use: No    Sexual activity: Yes     Birth control/protection: Injection   Other Topics Concern    Not on file   Social History Narrative    Not on file     Social Determinants of Health     Financial Resource Strain: Not on file   Food Insecurity: Not on file   Transportation Needs: Not on file   Physical Activity: Not on file   Stress: Not on file   Social Connections: Not on file   Intimate Partner Violence: Not on file   Housing Stability: Not on file       Review of Systems   Constitutional:  Negative for chills and fever. HENT:  Positive for congestion, ear pain, rhinorrhea and sinus pain. Negative for sneezing and sore throat. Eyes:  Negative for visual disturbance. Respiratory:  Positive for cough (productive). Negative for chest tightness and shortness of breath. Cardiovascular:  Negative for palpitations. Gastrointestinal:  Positive for nausea and vomiting. Negative for abdominal pain and constipation. Musculoskeletal:  Positive for arthralgias and myalgias. Skin:  Negative for rash. Neurological:  Positive for dizziness and headaches (sinus). OBJECTIVE:    Physical Exam  Vitals and nursing note reviewed. Constitutional:       General: She is not in acute distress. Appearance: She is well-developed. She is not ill-appearing. HENT:      Head: Normocephalic and atraumatic. Right Ear: External ear normal. Tympanic membrane is retracted. Left Ear: External ear normal. Tympanic membrane is retracted. Nose:      Right Sinus: Frontal sinus tenderness present. Left Sinus: Frontal sinus tenderness present. Mouth/Throat:      Mouth: Mucous membranes are moist.   Eyes:      Extraocular Movements:      Right eye: Right eye nystagmus: slight. Left eye: Left eye nystagmus: slight.       Conjunctiva/sclera: Conjunctivae normal.      Pupils: Pupils are equal, round, and reactive to light. Neck:      Trachea: No tracheal deviation. Cardiovascular:      Rate and Rhythm: Normal rate and regular rhythm. Heart sounds: Normal heart sounds. Pulmonary:      Effort: Pulmonary effort is normal. No respiratory distress. Breath sounds: Normal breath sounds. No wheezing or rales. Chest:      Chest wall: No tenderness. Abdominal:      General: Bowel sounds are normal. There is no distension. Palpations: Abdomen is soft. There is no mass. Tenderness: There is no abdominal tenderness. Hernia: No hernia is present. Musculoskeletal:         General: Normal range of motion. Cervical back: Normal range of motion and neck supple. Skin:     General: Skin is warm and dry. Capillary Refill: Capillary refill takes less than 2 seconds. Findings: No rash. Neurological:      General: No focal deficit present. Mental Status: She is alert and oriented to person, place, and time. Cranial Nerves: No cranial nerve deficit. Motor: No weakness. Psychiatric:         Behavior: Behavior normal.     BP (!) 126/92   Pulse 92   Temp 98.4 °F (36.9 °C)   Resp 18   Ht 5' 8\" (1.727 m)   Wt 188 lb 3.2 oz (85.4 kg)   SpO2 99%   BMI 28.62 kg/m²    BP Readings from Last 3 Encounters:   12/30/22 (!) 126/92   11/14/22 116/82   07/18/22 118/80      Wt Readings from Last 3 Encounters:   12/30/22 188 lb 3.2 oz (85.4 kg)   11/14/22 188 lb 3.2 oz (85.4 kg)   07/18/22 179 lb 12.8 oz (81.6 kg)       ASSESSMENT & PLAN:    1. Acute bacterial sinusitis  - azithromycin (ZITHROMAX) 250 MG tablet; Take 1 tablet by mouth See Admin Instructions for 5 days 500mg on day 1 followed by 250mg on days 2 - 5  Dispense: 6 tablet; Refill: 0  - fluticasone (FLONASE) 50 MCG/ACT nasal spray; 2 sprays by Each Nostril route daily  Dispense: 1 each; Refill: 1  - Antibiotic as directed.   - Flonase 1-2 puffs to each nostril daily or twice daily  - Prescription cough medication 2-3 times a day as needed for cough, may cause drowsiness.  - Cepacol Lozenges as needed for sore throat. - Tylenol and or Motrin as needed/directed for pain and fever.    - Encourage vitam C, vitamin D and Zinc to boost immunity  - Encourage rest and fluids. 2. Acute cough  - benzonatate (TESSALON) 100 MG capsule; Take 1 capsule by mouth 3 times daily as needed for Cough  Dispense: 30 capsule; Refill: 0    3. Fever, unspecified fever cause  - Tylenol and or Motrin as needed/directed for pain and fever. 4. Vertigo  - meclizine (ANTIVERT) 25 MG tablet; Take 1 tablet by mouth 3 times daily as needed for Dizziness or Nausea  Dispense: 30 tablet; Refill: 0  - Meclizine 12.5 to 25 mg (half to full tab) 3 time daily as needed for dizziness, may cause drowsiness  - Discussed vertigo exercises, information given  - Call/follow up if symptoms worsen or persist    5. Influenza-like illness  - POCT Influenza A/B (negative)   - Small frequent sips and snacks/meals  - Keep diet bland avoid spicy and greasy foods  - BRAT diet (bananas, rice, applesauce and toast)  - Advance as tolerated (scrambled eggs, baked chicken)  - To the ER for increased pain, fever, inability to hold down food and fluids    Continue current treatment plan.     Current Outpatient Medications   Medication Sig Dispense Refill    azithromycin (ZITHROMAX) 250 MG tablet Take 1 tablet by mouth See Admin Instructions for 5 days 500mg on day 1 followed by 250mg on days 2 - 5 6 tablet 0    benzonatate (TESSALON) 100 MG capsule Take 1 capsule by mouth 3 times daily as needed for Cough 30 capsule 0    fluticasone (FLONASE) 50 MCG/ACT nasal spray 2 sprays by Each Nostril route daily 1 each 1    meclizine (ANTIVERT) 25 MG tablet Take 1 tablet by mouth 3 times daily as needed for Dizziness or Nausea 30 tablet 0    ondansetron (ZOFRAN-ODT) 4 MG disintegrating tablet Take 1 tablet by mouth 3 times daily as needed for Nausea or Vomiting 21 tablet 0    atomoxetine (STRATTERA) 60 MG capsule Take 1 capsule by mouth daily 90 capsule 1    fluvoxaMINE (LUVOX) 100 MG tablet Take 1 tablet by mouth nightly 90 tablet 1    hydrOXYzine HCl (ATARAX) 25 MG tablet Take 1 tablet by mouth every 12 hours as needed for Anxiety (insomnia) 90 tablet 1    pramipexole (MIRAPEX) 0.5 MG tablet Take 1 tablet by mouth nightly 90 tablet 1    prochlorperazine (COMPAZINE) 10 MG tablet Take 1 tablet by mouth every 8 hours as needed (nausea) 15 tablet 1    albuterol sulfate HFA (PROVENTIL;VENTOLIN;PROAIR) 108 (90 Base) MCG/ACT inhaler Inhale 2 puffs into the lungs every 4-6 hours as needed for Wheezing or Shortness of Breath (Patient not taking: No sig reported) 18 g 1    dicyclomine (BENTYL) 20 MG tablet Take 1 tablet by mouth every 8 hours as needed (abdominal cramping) 30 tablet 2     No current facility-administered medications for this visit. Return if symptoms worsen or fail to improve, for sinusitis, cough, fevcr, vertigo, influenza-like illness. Bayron Diallo received counseling on the following healthy behaviors: nutrition, exercise, and medication adherence    Patient given educational materials on Exercise and sinusitis, vertigo, influenza    Discussed use, benefit, and side effects of prescribed medications. Barriers to medication compliance addressed. All patient questions answered. Pt voiced understanding. Call office if experience side effects from medications. Please note that some or all of this record was generated using voice recognition software. If there are any questions about the content of this document, please contact the author as some errors in transcription may have occurred.

## 2023-01-11 DIAGNOSIS — G25.81 RESTLESS LEGS SYNDROME (RLS): ICD-10-CM

## 2023-01-11 RX ORDER — PRAMIPEXOLE DIHYDROCHLORIDE 0.5 MG/1
0.5 TABLET ORAL NIGHTLY
Qty: 90 TABLET | Refills: 1 | OUTPATIENT
Start: 2023-01-11

## 2023-01-21 DIAGNOSIS — J01.90 ACUTE BACTERIAL SINUSITIS: ICD-10-CM

## 2023-01-21 DIAGNOSIS — B96.89 ACUTE BACTERIAL SINUSITIS: ICD-10-CM

## 2023-01-23 RX ORDER — FLUTICASONE PROPIONATE 50 MCG
2 SPRAY, SUSPENSION (ML) NASAL DAILY
Refills: 1 | OUTPATIENT
Start: 2023-01-23

## 2023-01-27 ENCOUNTER — OFFICE VISIT (OUTPATIENT)
Dept: FAMILY MEDICINE CLINIC | Age: 43
End: 2023-01-27
Payer: COMMERCIAL

## 2023-01-27 VITALS
OXYGEN SATURATION: 99 % | HEART RATE: 92 BPM | BODY MASS INDEX: 27.98 KG/M2 | WEIGHT: 184 LBS | SYSTOLIC BLOOD PRESSURE: 112 MMHG | TEMPERATURE: 97.4 F | DIASTOLIC BLOOD PRESSURE: 82 MMHG

## 2023-01-27 DIAGNOSIS — Z13.29 SCREENING FOR THYROID DISORDER: ICD-10-CM

## 2023-01-27 DIAGNOSIS — Z00.00 ANNUAL PHYSICAL EXAM: Primary | ICD-10-CM

## 2023-01-27 DIAGNOSIS — Z02.1 PHYSICAL EXAM, PRE-EMPLOYMENT: ICD-10-CM

## 2023-01-27 DIAGNOSIS — Z13.220 SCREENING FOR CHOLESTEROL LEVEL: ICD-10-CM

## 2023-01-27 LAB
A/G RATIO: 1.9 (ref 1.1–2.2)
ALBUMIN SERPL-MCNC: 4.2 G/DL (ref 3.4–5)
ALP BLD-CCNC: 89 U/L (ref 40–129)
ALT SERPL-CCNC: 13 U/L (ref 10–40)
ANION GAP SERPL CALCULATED.3IONS-SCNC: 12 MMOL/L (ref 3–16)
AST SERPL-CCNC: 16 U/L (ref 15–37)
BASOPHILS ABSOLUTE: 0.1 K/UL (ref 0–0.2)
BASOPHILS RELATIVE PERCENT: 0.6 %
BILIRUB SERPL-MCNC: <0.2 MG/DL (ref 0–1)
BUN BLDV-MCNC: 12 MG/DL (ref 7–20)
CALCIUM SERPL-MCNC: 9.3 MG/DL (ref 8.3–10.6)
CHLORIDE BLD-SCNC: 103 MMOL/L (ref 99–110)
CHOLESTEROL, TOTAL: 162 MG/DL (ref 0–199)
CO2: 25 MMOL/L (ref 21–32)
CREAT SERPL-MCNC: 0.6 MG/DL (ref 0.6–1.1)
EOSINOPHILS ABSOLUTE: 0.1 K/UL (ref 0–0.6)
EOSINOPHILS RELATIVE PERCENT: 1.2 %
GFR SERPL CREATININE-BSD FRML MDRD: >60 ML/MIN/{1.73_M2}
GLUCOSE BLD-MCNC: 62 MG/DL (ref 70–99)
HCT VFR BLD CALC: 42 % (ref 36–48)
HDLC SERPL-MCNC: 50 MG/DL (ref 40–60)
HEMOGLOBIN: 13.8 G/DL (ref 12–16)
LDL CHOLESTEROL CALCULATED: 94 MG/DL
LYMPHOCYTES ABSOLUTE: 1.3 K/UL (ref 1–5.1)
LYMPHOCYTES RELATIVE PERCENT: 12.5 %
MCH RBC QN AUTO: 29.2 PG (ref 26–34)
MCHC RBC AUTO-ENTMCNC: 32.8 G/DL (ref 31–36)
MCV RBC AUTO: 89 FL (ref 80–100)
MONOCYTES ABSOLUTE: 0.8 K/UL (ref 0–1.3)
MONOCYTES RELATIVE PERCENT: 7.7 %
NEUTROPHILS ABSOLUTE: 8.1 K/UL (ref 1.7–7.7)
NEUTROPHILS RELATIVE PERCENT: 78 %
PDW BLD-RTO: 13.6 % (ref 12.4–15.4)
PLATELET # BLD: 370 K/UL (ref 135–450)
PMV BLD AUTO: 8.2 FL (ref 5–10.5)
POTASSIUM SERPL-SCNC: 4 MMOL/L (ref 3.5–5.1)
RBC # BLD: 4.72 M/UL (ref 4–5.2)
RUBELLA ANTIBODY IGG: 51.3 IU/ML
SODIUM BLD-SCNC: 140 MMOL/L (ref 136–145)
TOTAL PROTEIN: 6.4 G/DL (ref 6.4–8.2)
TRIGL SERPL-MCNC: 92 MG/DL (ref 0–150)
TSH REFLEX: 1.25 UIU/ML (ref 0.27–4.2)
VLDLC SERPL CALC-MCNC: 18 MG/DL
WBC # BLD: 10.4 K/UL (ref 4–11)

## 2023-01-27 PROCEDURE — 81002 URINALYSIS NONAUTO W/O SCOPE: CPT | Performed by: CLINICAL NURSE SPECIALIST

## 2023-01-27 PROCEDURE — 99396 PREV VISIT EST AGE 40-64: CPT | Performed by: CLINICAL NURSE SPECIALIST

## 2023-01-27 PROCEDURE — 90715 TDAP VACCINE 7 YRS/> IM: CPT | Performed by: CLINICAL NURSE SPECIALIST

## 2023-01-27 PROCEDURE — G8484 FLU IMMUNIZE NO ADMIN: HCPCS | Performed by: CLINICAL NURSE SPECIALIST

## 2023-01-27 PROCEDURE — 90471 IMMUNIZATION ADMIN: CPT | Performed by: CLINICAL NURSE SPECIALIST

## 2023-01-27 ASSESSMENT — PATIENT HEALTH QUESTIONNAIRE - PHQ9
SUM OF ALL RESPONSES TO PHQ QUESTIONS 1-9: 0
SUM OF ALL RESPONSES TO PHQ QUESTIONS 1-9: 0
3. TROUBLE FALLING OR STAYING ASLEEP: 0
SUM OF ALL RESPONSES TO PHQ QUESTIONS 1-9: 0
2. FEELING DOWN, DEPRESSED OR HOPELESS: 0
SUM OF ALL RESPONSES TO PHQ QUESTIONS 1-9: 0

## 2023-01-27 ASSESSMENT — ENCOUNTER SYMPTOMS
RHINORRHEA: 0
COLOR CHANGE: 0
COUGH: 0
NAUSEA: 0
BACK PAIN: 0
DIARRHEA: 0
ABDOMINAL PAIN: 0
WHEEZING: 0
VOMITING: 0
SHORTNESS OF BREATH: 0
CONSTIPATION: 0
CHEST TIGHTNESS: 0

## 2023-01-27 NOTE — PROGRESS NOTES
SUBJECTIVE:    Patient ID:  Nicky Spivey is a 40 y.o. female      Patient is here for a complete physical.  She has no questions or concerns regarding her health. Medications: see current outpatient medications  Supplements: Vit D, MVI   Diet: fairly healthy veg eat prima fish and chicken   Activity: does not work out on a regular basis, but stays busy with work and house keeping  Safety: Uses sunscreen when out for extended periods of time, wears her seatbelt, does not drink and drive, non-smoker      Current Outpatient Medications on File Prior to Visit   Medication Sig Dispense Refill    fluticasone (FLONASE) 50 MCG/ACT nasal spray 2 sprays by Each Nostril route daily 1 each 1    ondansetron (ZOFRAN-ODT) 4 MG disintegrating tablet Take 1 tablet by mouth 3 times daily as needed for Nausea or Vomiting 21 tablet 0    atomoxetine (STRATTERA) 60 MG capsule Take 1 capsule by mouth daily 90 capsule 1    fluvoxaMINE (LUVOX) 100 MG tablet Take 1 tablet by mouth nightly 90 tablet 1    hydrOXYzine HCl (ATARAX) 25 MG tablet Take 1 tablet by mouth every 12 hours as needed for Anxiety (insomnia) 90 tablet 1    pramipexole (MIRAPEX) 0.5 MG tablet Take 1 tablet by mouth nightly 90 tablet 1    prochlorperazine (COMPAZINE) 10 MG tablet Take 1 tablet by mouth every 8 hours as needed (nausea) 15 tablet 1    albuterol sulfate HFA (PROVENTIL;VENTOLIN;PROAIR) 108 (90 Base) MCG/ACT inhaler Inhale 2 puffs into the lungs every 4-6 hours as needed for Wheezing or Shortness of Breath 18 g 1    dicyclomine (BENTYL) 20 MG tablet Take 1 tablet by mouth every 8 hours as needed (abdominal cramping) 30 tablet 2     No current facility-administered medications on file prior to visit.       Past Medical History:   Diagnosis Date    Acute sinusitis 06/24/2013    Acute sinusitis 06/24/2013    ADHD (attention deficit hyperactivity disorder)     Anxiety     ALEXSANDRA II (cervical intraepithelial neoplasia II) 4/21/09;10/2012     Sigrid([facility]N):[facility].Repeat pap advised 10/22/09:nml w/next screenings yrly advised.    Depression     Gastroesophageal reflux disease without esophagitis     Hiatal hernia:small 06/27/2017    HPV test positive 11/04/2013    Repeat pap smear & HPV testing due 11/4/14    Hypercholesteremia     Hyperlipidemia     Hypertension 2006    Insomnia 08/19/2015    Irritable bowel syndrome     LGSIL (low grade squamous intraepithelial lesion) on Pap smear 02/11/2009    Low HDL (under 40) 10/09/2012    Lumbar back pain 02/14/2019    Migraine     Panic attack 09/18/2012    Pap smear 2007;11/30/2011;11/2013;11/25/15;3/2020    11/25/15=nml. Next due 11/2014. 2007-prior abnml paps pt unsure of dx, 2/11/2009 LGSIL pap colposcopy 4/21/2009 with bx -CIN1-2, per  gyn Dr Matta observation with repeat pap sched 10/22/09,    Preeclampsia 2006    Pregnancy    Renal stone:left 06/27/2017    Restless legs syndrome     Ulcer aphthous oral 12/28/2010    Urinary incontinence 09/28/2017    Uterine leiomyoma 09/28/2017     Past Surgical History:   Procedure Laterality Date    FINGER SURGERY Left      Family History   Problem Relation Age of Onset    Asthma Mother     Cancer Maternal Grandmother     High Blood Pressure Paternal Aunt     High Cholesterol Paternal Uncle     Parkinsonism Paternal Uncle     High Cholesterol Paternal Uncle     Stroke Maternal Aunt     High Blood Pressure Maternal Aunt     Other Maternal Aunt         Brain hemorrhage     Social History     Socioeconomic History    Marital status:      Spouse name: Not on file    Number of children: Not on file    Years of education: Not on file    Highest education level: Not on file   Occupational History    Occupation: [employer]   Tobacco Use    Smoking status: Former     Packs/day: 0.50     Years: 10.00     Pack years: 5.00     Types: Cigarettes    Smokeless tobacco: Never   Vaping Use    Vaping Use: Never used   Substance and Sexual Activity    Alcohol  use: Not Currently    Drug use: No    Sexual activity: Yes     Birth control/protection: Injection   Other Topics Concern    Not on file   Social History Narrative    Not on file     Social Determinants of Health     Financial Resource Strain: Not on file   Food Insecurity: Not on file   Transportation Needs: Not on file   Physical Activity: Not on file   Stress: Not on file   Social Connections: Not on file   Intimate Partner Violence: Not on file   Housing Stability: Not on file       Review of Systems   Constitutional:  Negative for appetite change, chills, fever and unexpected weight change. HENT:  Negative for congestion, dental problem, postnasal drip and rhinorrhea. Regular dental exams   Eyes:  Negative for visual disturbance. Regular eye exams   Respiratory:  Negative for cough, chest tightness, shortness of breath and wheezing. Cardiovascular:  Negative for chest pain, palpitations and leg swelling. Gastrointestinal:  Negative for abdominal pain, constipation, diarrhea, nausea and vomiting. Endocrine: Negative for cold intolerance, heat intolerance, polydipsia, polyphagia and polyuria. Genitourinary:  Negative for dysuria, frequency and urgency. Musculoskeletal:  Negative for arthralgias, back pain and myalgias. Skin:  Negative for color change and rash. Allergic/Immunologic: Negative for environmental allergies. Neurological:  Negative for headaches. Hematological:  Does not bruise/bleed easily. Psychiatric/Behavioral:  Negative for dysphoric mood and sleep disturbance. The patient is not nervous/anxious. OBJECTIVE:    Physical Exam  Vitals and nursing note reviewed. Constitutional:       General: She is not in acute distress. Appearance: She is well-developed. She is not ill-appearing. HENT:      Head: Normocephalic and atraumatic.       Right Ear: External ear normal.      Left Ear: External ear normal.      Nose: Nose normal.      Mouth/Throat: Mouth: Mucous membranes are moist.   Eyes:      Conjunctiva/sclera: Conjunctivae normal.      Pupils: Pupils are equal, round, and reactive to light. Neck:      Vascular: No carotid bruit. Trachea: No tracheal deviation. Cardiovascular:      Rate and Rhythm: Normal rate and regular rhythm. Heart sounds: Normal heart sounds. Pulmonary:      Effort: Pulmonary effort is normal. No respiratory distress. Breath sounds: Normal breath sounds. No wheezing or rales. Chest:      Chest wall: No tenderness. Abdominal:      General: Bowel sounds are normal. There is no distension. Palpations: Abdomen is soft. There is no mass. Tenderness: There is no abdominal tenderness. Hernia: No hernia is present. Musculoskeletal:         General: Normal range of motion. Cervical back: Normal range of motion and neck supple. Right lower leg: No edema. Left lower leg: No edema. Skin:     General: Skin is warm and dry. Capillary Refill: Capillary refill takes less than 2 seconds. Findings: No rash. Neurological:      Mental Status: She is alert and oriented to person, place, and time. Psychiatric:         Behavior: Behavior normal.     /82   Pulse 92   Temp 97.4 °F (36.3 °C)   Wt 184 lb (83.5 kg)   LMP 12/29/2022   SpO2 99%   BMI 27.98 kg/m²    BP Readings from Last 3 Encounters:   01/27/23 112/82   12/30/22 (!) 126/92   11/14/22 116/82      Wt Readings from Last 3 Encounters:   01/27/23 184 lb (83.5 kg)   12/30/22 188 lb 3.2 oz (85.4 kg)   11/14/22 188 lb 3.2 oz (85.4 kg)       ASSESSMENT & PLAN:    1. Annual physical exam  - CBC with Auto Differential; Future  - Comprehensive Metabolic Panel; Future  - Lipid Panel; Future  - TSH with Reflex; Future  - POCT Urinalysis no Micro    2. Physical exam, pre-employment  - Tdap, BOOSTRIX, (age 8 yrs+), IM  - Mumps Antibody, IgG; Future  - Rubella antibody, IgG; Future  - Rubeola Antibody, IgG; Future    3.  Screening for cholesterol level  - CBC with Auto Differential; Future  - Comprehensive Metabolic Panel; Future  - Lipid Panel; Future    4. Screening for thyroid disorder  - CBC with Auto Differential; Future  - Comprehensive Metabolic Panel; Future  - TSH with Reflex; Future    Continue current treatment plan. Current Outpatient Medications   Medication Sig Dispense Refill    fluticasone (FLONASE) 50 MCG/ACT nasal spray 2 sprays by Each Nostril route daily 1 each 1    ondansetron (ZOFRAN-ODT) 4 MG disintegrating tablet Take 1 tablet by mouth 3 times daily as needed for Nausea or Vomiting 21 tablet 0    atomoxetine (STRATTERA) 60 MG capsule Take 1 capsule by mouth daily 90 capsule 1    fluvoxaMINE (LUVOX) 100 MG tablet Take 1 tablet by mouth nightly 90 tablet 1    hydrOXYzine HCl (ATARAX) 25 MG tablet Take 1 tablet by mouth every 12 hours as needed for Anxiety (insomnia) 90 tablet 1    pramipexole (MIRAPEX) 0.5 MG tablet Take 1 tablet by mouth nightly 90 tablet 1    prochlorperazine (COMPAZINE) 10 MG tablet Take 1 tablet by mouth every 8 hours as needed (nausea) 15 tablet 1    albuterol sulfate HFA (PROVENTIL;VENTOLIN;PROAIR) 108 (90 Base) MCG/ACT inhaler Inhale 2 puffs into the lungs every 4-6 hours as needed for Wheezing or Shortness of Breath 18 g 1    dicyclomine (BENTYL) 20 MG tablet Take 1 tablet by mouth every 8 hours as needed (abdominal cramping) 30 tablet 2     No current facility-administered medications for this visit. Return if symptoms worsen or fail to improve, for annual/work physical, screening . Bernard Ortega received counseling on the following healthy behaviors: nutrition, exercise, and medication adherence    Patient given educational materials on health maintenance    Discussed use, benefit, and side effects of prescribed medications. Barriers to medication compliance addressed. All patient questions answered. Pt voiced understanding.       Call office if experience side effects from medications. Please note that some or all of this record was generated using voice recognition software. If there are any questions about the content of this document, please contact the author as some errors in transcription may have occurred.

## 2023-01-28 LAB
MEASLES IMMUNE (IGG): NORMAL
MUMPS AB IGG: NORMAL

## 2023-02-07 DIAGNOSIS — F41.8 MIXED ANXIETY AND DEPRESSIVE DISORDER: ICD-10-CM

## 2023-02-07 DIAGNOSIS — G47.9 SLEEP DISTURBANCES: ICD-10-CM

## 2023-02-08 RX ORDER — HYDROXYZINE HYDROCHLORIDE 25 MG/1
25 TABLET, FILM COATED ORAL EVERY 12 HOURS PRN
Qty: 180 TABLET | OUTPATIENT
Start: 2023-02-08 | End: 2023-05-09

## 2023-02-27 ENCOUNTER — TELEMEDICINE (OUTPATIENT)
Dept: FAMILY MEDICINE CLINIC | Age: 43
End: 2023-02-27
Payer: COMMERCIAL

## 2023-02-27 DIAGNOSIS — J34.2 DEVIATED NASAL SEPTUM: ICD-10-CM

## 2023-02-27 DIAGNOSIS — J32.4 CHRONIC PANSINUSITIS: Primary | ICD-10-CM

## 2023-02-27 PROCEDURE — 99213 OFFICE O/P EST LOW 20 MIN: CPT | Performed by: FAMILY MEDICINE

## 2023-02-27 PROCEDURE — G8427 DOCREV CUR MEDS BY ELIG CLIN: HCPCS | Performed by: FAMILY MEDICINE

## 2023-02-27 RX ORDER — DOXYCYCLINE HYCLATE 100 MG
100 TABLET ORAL EVERY 12 HOURS
Qty: 20 TABLET | Refills: 0 | Status: SHIPPED | OUTPATIENT
Start: 2023-02-27 | End: 2023-03-09

## 2023-02-27 RX ORDER — AZELASTINE 1 MG/ML
2 SPRAY, METERED NASAL 2 TIMES DAILY
Qty: 120 ML | Refills: 1 | Status: SHIPPED | OUTPATIENT
Start: 2023-02-27

## 2023-02-27 RX ORDER — BROMPHENIRAMINE MALEATE, PSEUDOEPHEDRINE HYDROCHLORIDE, AND DEXTROMETHORPHAN HYDROBROMIDE 2; 30; 10 MG/5ML; MG/5ML; MG/5ML
5 SYRUP ORAL EVERY 6 HOURS PRN
Qty: 118 ML | Refills: 0 | Status: SHIPPED | OUTPATIENT
Start: 2023-02-27

## 2023-02-27 SDOH — ECONOMIC STABILITY: FOOD INSECURITY: WITHIN THE PAST 12 MONTHS, THE FOOD YOU BOUGHT JUST DIDN'T LAST AND YOU DIDN'T HAVE MONEY TO GET MORE.: NEVER TRUE

## 2023-02-27 SDOH — ECONOMIC STABILITY: FOOD INSECURITY: WITHIN THE PAST 12 MONTHS, YOU WORRIED THAT YOUR FOOD WOULD RUN OUT BEFORE YOU GOT MONEY TO BUY MORE.: NEVER TRUE

## 2023-02-27 SDOH — ECONOMIC STABILITY: INCOME INSECURITY: HOW HARD IS IT FOR YOU TO PAY FOR THE VERY BASICS LIKE FOOD, HOUSING, MEDICAL CARE, AND HEATING?: NOT HARD AT ALL

## 2023-02-27 SDOH — ECONOMIC STABILITY: TRANSPORTATION INSECURITY
IN THE PAST 12 MONTHS, HAS LACK OF TRANSPORTATION KEPT YOU FROM MEETINGS, WORK, OR FROM GETTING THINGS NEEDED FOR DAILY LIVING?: NO

## 2023-02-27 SDOH — ECONOMIC STABILITY: HOUSING INSECURITY
IN THE LAST 12 MONTHS, WAS THERE A TIME WHEN YOU DID NOT HAVE A STEADY PLACE TO SLEEP OR SLEPT IN A SHELTER (INCLUDING NOW)?: NO

## 2023-02-27 ASSESSMENT — ENCOUNTER SYMPTOMS
VOMITING: 0
ABDOMINAL PAIN: 0
CHEST TIGHTNESS: 0
TROUBLE SWALLOWING: 0
BLOOD IN STOOL: 0
NAUSEA: 0
ABDOMINAL DISTENTION: 0
BACK PAIN: 0
SORE THROAT: 0
CONSTIPATION: 0
SINUS PRESSURE: 1
SHORTNESS OF BREATH: 0
WHEEZING: 0
RHINORRHEA: 1
COUGH: 1
DIARRHEA: 0

## 2023-02-27 ASSESSMENT — PATIENT HEALTH QUESTIONNAIRE - PHQ9
SUM OF ALL RESPONSES TO PHQ QUESTIONS 1-9: 0
SUM OF ALL RESPONSES TO PHQ9 QUESTIONS 1 & 2: 0
SUM OF ALL RESPONSES TO PHQ QUESTIONS 1-9: 0
SUM OF ALL RESPONSES TO PHQ QUESTIONS 1-9: 0
1. LITTLE INTEREST OR PLEASURE IN DOING THINGS: 0
2. FEELING DOWN, DEPRESSED OR HOPELESS: 0
SUM OF ALL RESPONSES TO PHQ QUESTIONS 1-9: 0

## 2023-02-27 NOTE — PROGRESS NOTES
Janelle Zamora   40 y.o. female   1980    HPI:    Virtual visit encounter type:   [x] Doxy. me  [] Telephone w/o video  [] MyChart  [] Other:     Patient was last seen by me on 11/14/2022. Reason(s) for visit:   Chief Complaint   Patient presents with    Illness     Sx include drainage, facial pressure, lymph nodes in neck swollen, headaches when lying down, semi-productive cough with green. Pt has taken OTC max strength mucinex and Advil  Ongoing on and off since 12/30 pt saw Flora Black and received medication which got better but wasn't completely resolved. Patient was seen today for issues noted below. Symptom duration:   []  day(s)  [x]  8 week(s)   [] None    Symptom course:  [] Worsening       [] Improving    [x] Stable/persistent  [x] Fluctuating (waxing/waning)        [] Asymptomatic     Other historical relevant factors:  [] Exposure to known sick contacts:  [] Social gatherings:    [] Close contact with a lab confirmed COVID-19 patient within 14 days of symptom onset  [] History of travel from affected geographical areas within 14 days of symptom onset  [] Health care worker exposure w/ no symptoms  [] Health care worker exposure symptomatic    COVID-19 test:  [] Positive       [] PCR test      [] Rapid test  [x] Negative      [] PCR test      [x] Rapid test   [] Not performed    Influenza test:   [] Positive   [] Negative  [x] Not performed    COVID-19 vaccination status:  [x] Vaccinated  [] Unvaccinated    Influenza vaccination status:  [] Vaccinated  [x] Unvaccinated    Immunization History   Administered Date(s) Administered    COVID-19, J&J, (age 18y+), IM, 0.5 mL 09/16/2021    Tdap (Boostrix, Adacel) 02/21/2013, 01/27/2023       Symptoms:  Review of Systems   Constitutional:  Positive for activity change and fatigue. Negative for appetite change, chills, fever and unexpected weight change.    HENT:  Positive for congestion, postnasal drip, rhinorrhea (green) and sinus pressure. Negative for sore throat and trouble swallowing. Negative for disturbance in taste    negative for disturbance in smell     Respiratory:  Positive for cough. Negative for chest tightness, shortness of breath and wheezing. Cardiovascular:  Negative for chest pain, palpitations and leg swelling. Gastrointestinal:  Negative for abdominal distention, abdominal pain, blood in stool, constipation, diarrhea, nausea and vomiting. Genitourinary:  Negative for dysuria, frequency and hematuria. Musculoskeletal:  Negative for arthralgias, back pain and myalgias. Skin:  Negative for rash. Neurological:  Negative for dizziness, weakness, light-headedness, numbness and headaches. Psychiatric/Behavioral:  Positive for sleep disturbance. Negative for agitation, decreased concentration, dysphoric mood and suicidal ideas. The patient is not nervous/anxious. [x] Other noteworthy relevant history:      Patient reported that she often feels congested (nasal area) and has deviated septum. She saw a pediatric ENT around her teen years. She had a nasal endoscopy and she was told that her left sinus cavity is smaller than right. She stated that she requires abx for sinus infection treatment at least 2-3x/year. Patient recently saw my colleague Jack Mariee - [facility]) on 12/30/2022 and was treated with Azithromycin and Fluticasone.     Medications used:  [] NSAID:             [] Ibuprofen (Motrin) - last used:             [] Naproxen (Aleve) - last used:              [] Meloxicam (Mobic)             [] Celecoxib (Celebrex)    [] Tylenol - last used:   [] Anti-tussives -  [] Anti-emetics -  [x] Nasal spray - Flonase  [] Decongestant -   [] Allergy medication -  [] Antibiotic -   [] Inhaler(s) -  [] Corticosteroids -   [x] OTC - Mucinex, pseudoephedrine  [x] Humidifier   [] Home remedies -    [] None    Medical risk factors:  [] Pregnant or possibly pregnant  [] Age 72 and older  [] Diabetes  [] Heart disease  [] Asthma  [] COPD/Other chronic lung diseases  [] Autoimmune disease  [] Active cancer  [] On chemotherapy/immunosuppressive drugs  [] Currently or recent history of taking oral corticosteroids  [] History of lymphoma/leukemia  [x] History of tobacco smoking  [] Current tobacco smoker  [] Obesity  [] Other:    Allergies   Allergen Reactions    Sulfa Antibiotics      Pt states that her throat closes up        Current Outpatient Medications on File Prior to Visit   Medication Sig Dispense Refill    fluticasone (FLONASE) 50 MCG/ACT nasal spray 2 sprays by Each Nostril route daily 1 each 1    ondansetron (ZOFRAN-ODT) 4 MG disintegrating tablet Take 1 tablet by mouth 3 times daily as needed for Nausea or Vomiting 21 tablet 0    atomoxetine (STRATTERA) 60 MG capsule Take 1 capsule by mouth daily 90 capsule 1    fluvoxaMINE (LUVOX) 100 MG tablet Take 1 tablet by mouth nightly 90 tablet 1    hydrOXYzine HCl (ATARAX) 25 MG tablet Take 1 tablet by mouth every 12 hours as needed for Anxiety (insomnia) 90 tablet 1    pramipexole (MIRAPEX) 0.5 MG tablet Take 1 tablet by mouth nightly 90 tablet 1    prochlorperazine (COMPAZINE) 10 MG tablet Take 1 tablet by mouth every 8 hours as needed (nausea) 15 tablet 1    albuterol sulfate HFA (PROVENTIL;VENTOLIN;PROAIR) 108 (90 Base) MCG/ACT inhaler Inhale 2 puffs into the lungs every 4-6 hours as needed for Wheezing or Shortness of Breath 18 g 1    dicyclomine (BENTYL) 20 MG tablet Take 1 tablet by mouth every 8 hours as needed (abdominal cramping) 30 tablet 2     No current facility-administered medications on file prior to visit.         Family History   Problem Relation Age of Onset    Asthma Mother     Cancer Maternal Grandmother     High Blood Pressure Paternal Aunt     High Cholesterol Paternal Uncle     Parkinsonism Paternal Uncle     High Cholesterol Paternal Uncle     Stroke Maternal Aunt     High Blood Pressure Maternal Aunt     Other Maternal Aunt         Brain hemorrhage Social History     Tobacco Use    Smoking status: Former     Packs/day: 0.50     Years: 10.00     Pack years: 5.00     Types: Cigarettes    Smokeless tobacco: Never   Substance Use Topics    Alcohol use: Not Currently        Lab Results   Component Value Date    WBC 10.4 01/27/2023    HGB 13.8 01/27/2023    HCT 42.0 01/27/2023    MCV 89.0 01/27/2023     01/27/2023       Chemistry        Component Value Date/Time     01/27/2023 1415    K 4.0 01/27/2023 1415     01/27/2023 1415    CO2 25 01/27/2023 1415    BUN 12 01/27/2023 1415    CREATININE 0.6 01/27/2023 1415        Component Value Date/Time    CALCIUM 9.3 01/27/2023 1415    ALKPHOS 89 01/27/2023 1415    AST 16 01/27/2023 1415    ALT 13 01/27/2023 1415    BILITOT <0.2 01/27/2023 1415          Lab Results   Component Value Date    ALT 13 01/27/2023    AST 16 01/27/2023    ALKPHOS 89 01/27/2023    BILITOT <0.2 01/27/2023     No results found for: LABA1C  No results found for: EAG    Review of systems:  As noted above. Otherwise, rest of ROS is negative. Wt Readings from Last 3 Encounters:   01/27/23 184 lb (83.5 kg)   12/30/22 188 lb 3.2 oz (85.4 kg)   11/14/22 188 lb 3.2 oz (85.4 kg)       BP Readings from Last 3 Encounters:   01/27/23 112/82   12/30/22 (!) 126/92   11/14/22 116/82       Pulse Readings from Last 3 Encounters:   01/27/23 92   12/30/22 92   11/14/22 (!) 106       There were no vitals taken for this visit. Physical Exam  Vitals reviewed. Constitutional:       General: She is awake. She is not in acute distress. Appearance: Normal appearance. She is overweight. She is not ill-appearing. HENT:      Head: Normocephalic and atraumatic. Right Ear: External ear normal.      Left Ear: External ear normal.      Nose: Nose normal.   Eyes:      General: No scleral icterus. Right eye: No discharge. Left eye: No discharge. Extraocular Movements: Extraocular movements intact.       Conjunctiva/sclera: Conjunctivae normal.   Pulmonary:      Effort: Pulmonary effort is normal. No respiratory distress. Breath sounds: No stridor. No wheezing. Abdominal:      General: There is no distension. Musculoskeletal:         General: Normal range of motion. Cervical back: Normal range of motion. No erythema. Skin:     Coloration: Skin is not cyanotic, jaundiced or pale. Findings: No erythema. Neurological:      General: No focal deficit present. Mental Status: She is alert and oriented to person, place, and time. Mental status is at baseline. Psychiatric:         Attention and Perception: Attention and perception normal.         Mood and Affect: Mood and affect normal.         Speech: Speech normal.         Behavior: Behavior normal. Behavior is cooperative. Thought Content: Thought content normal.        Assessment/Plan:   Glenda Moreno was seen today for illness. Diagnoses and all orders for this visit:    Chronic pansinusitis  -     [facility]- ENT  -     brompheniramine-pseudoephedrine-DM 2-30-10 MG/5ML syrup; Take 5 mLs by mouth every 6 hours as needed for Congestion or Cough  -     doxycycline hyclate (VIBRA-TABS) 100 MG tablet; Take 1 tablet by mouth in the morning and 1 tablet in the evening. Do all this for 10 days. -     azelastine (ASTELIN) 0.1 % nasal spray; 2 sprays by Nasal route 2 times daily Use in each nostril as directed    Deviated nasal septum    Discussion about:  [x] Supportive care - hydration with plenty of water and/or Gatorade/Powerade or nutritional shakes (e.g. Ensure, Boost, etc.)    [x] Monitor temperature and vital signs. [x] I discussed with patient that most causes of sinus/bronchictis infection are likely the result of a a viral infection and as a result, antibiotic therapy would be ineffective and eventually resolve given time.   I discussed with the patient about current medical guidelines regarding the use of antibiotics in sinusitis - 1) persistent symptoms with no improvement > 10 days 2) severe symptoms consisting of high fever 101.3 or more and nasal discharge for at least 3 days -or- resolving URI symptoms, followed by new/recurrent fever and exacerbation of nasal symptoms on day 6-7 or later.     [] Informed COVID-19 is a viral infection. Antibiotics are not recommended in the treatment of viral infections (routinely). [] Monoclonal antibody - patient vocalized understanding that this therapy was approved by FDA under [employer] and gave permission for approval to be scheduled an appointment for an infusion. [] Influenza medications (Tamiflu or Castro Boas) - only effective within 48 hours of symptom onset. [] COVID-19 medications (Paxlovid or Lagevrio) - only effective within 5 days of symptom onset and is meant for mild to moderate COVID-19 infection (outpatients with high risk of progression to severe illness). [] Famotidine - some studies have shown Famotidine 80 mg TID x 14 days leads to earlier resolution of symptoms. [x] The time that it takes for each person to recover from infection differs and is largely dependent on many factors such as chronic health conditions, being immunocompetent (or not), taking the medications (if prescribed) as directed, age, etc.    Recommendations:  [x] Advised patient to notify anyone that they have been in close contact with about their symptoms. [] Recommended quarantining at least 5 days (per current CDC guidelines) from either day of exposure or from onset of symptoms. [] Informed patient that having a COVID-19 test done prior to day 5 (of either exposure or symptom onset) could lead to a false negative result and that testing would be recommended (preferably with a PCR test) on day 5 or later. [] Recommended obtaining a pulse oximeter (if unavailable) to measure oxygen saturation.      [] Recommended using an incentive spirometer and deep breathing exercises to help prevent atelectasis. [] Patient was offered as needed medications (inhaler, anti-tussive, anti-emetic, etc.) but declined. Other:  [] Notification to work/school letter provided to patient. I reviewed the plan of care with the patient. Patient acknowledged understanding and agreed with plan of care overall. There are no discontinued medications. General information on medications:  -When it comes to medications, whether with starting or adding a new medication or increasing the dose of a current medication, the benefits and risks have to always be considered and weighed over, especially if one is taking other medications as well. -There are no medications that have no side effects and that there is always a risk involved with taking a medication.    -If a side effect were to occur with starting a new medication or with increasing the dose of a current medication that either the medication can be totally discontinued altogether or simply decrease the dose of it and if this would be the case a follow-up appointment would be deemed necessary.    -The drug allergy list will then be updated with the corresponding side effect(s) if it's deemed to be a true 'drug allergy'. -The most common adverse effects of medication(s) were addressed at today's visit.    -Lastly, the coverage status of a medication may vary from insurance to insurance and the only way to verify if the medication is covered is to send an actual prescription in.    -The drug formulary of each insurance changes without any warning or notification to the healthcare provider let alone the pharmacy.  -The cost of medications vary from insurance to insurance and the cost is always subject to change just like the drug formulary.     General disclaimer regarding telemedicine (virtual) visits:  Devin Gilmore is a 40 y.o. female is being evaluated by a virtual visit (video visit) and/or telephone (without video) encounter to address their concern(s) as mentioned above. Prior to arranging this appointment, the patient was made aware of the need and importance to schedule the visit in this manner given the current ongoing COVID-19 pandemic. The patient was also made aware that the telemedicine service used (e.g.doxy. me) would not save let alone record any information (audio, video, and text) regarding the actual virtual visit. After this discussion, the patient acknowledged understanding and agreed to today's virtual visit encounter. A caregiver was present when appropriate as well as an  if requested. The patient is aware that telemedicine visits are a billable service just like an in person visit is. The patient was located in a state where the healthcare provider was licensed to provide care. Due to this being a TeleHealth encounter (during the OGSAA-17 public health emergency), evaluation of the following organ systems was overall limited: Vitals/Constitutional/EENT/Resp/CV/GI//MS/Neuro/Skin/Heme-Lymph-Imm. As a result, establishing a diagnosis(s) and formulating a plan of care may be overall more difficult and complicated compared to a conventional (face-to-face) office visit. The patient was made aware about the potential limitations and difficulties of a telemedicine visit such as having technical difficulties related to video and/or audio issues often stemming from a sub-optimal/poor Internet or cellular data connection and/or other factors. If this is judged to be the case then the patient would be informed if deemed relevant and necessary that an in-person follow-up visit may be recommended.       Pursuant to the emergency declaration under the [facility][note 3][facility], [address] [place] [address] authority and the [employer] Act, this virtual visit was conducted with the patient's (and/or legal guardian's) consent, to reduce the patient's risk (as well as others) of exposure to COVID-19 and to continue to maintain and provide necessary medical care. The patient (and/or legal guardian) has also been advised to contact this office for worsening conditions or problems, and seek emergency medical treatment and/or call 911 if deemed necessary. Follow-up: Return if symptoms worsen or fail to improve. .     Patient was informed that if his or her symptoms worsen to follow up with me sooner or go to the nearest ER if the symptoms are very significant and warrant higher level of care. Regarding my note:  -This note was composed (by me only and not with assistance via a scribe) to the best of my knowledge and recollection of the encounter with the patient using one of my own customized note templates utilizing a combination of typing and dictating with the [address] [place] [address] speech recognition software. As a result, the note may possibly contain various errors (e.g. spelling, grammar, and non-sensible words/phrases/statements) despite reviewing the note prior to signing it for completion. Time spent includes some or all of the following, both face-to-face time and non face-to-face time, but is not limited to:  [x] Preparing to see the patient by reviewing medical records available (notes, labs, imaging, etc.) prior to seeing the patient. [x] Obtaining and/or reviewing the history from the patient. [x] Performing a medically appropriate examination. [x] Ordering of relevant lab work, medications, referrals, or procedures. [x] Discussing patient's medical issues and formulating an assessment and plan. [x] Reviewing plan of care with patient. Answering any questions or concerns. [x] Documentation within the electronic health record (EHR)  [] Reviewing records of history relevant to patient's issues after seeing the patient. [] Discussion or coordination of care with other health care professionals.   [x] Other: length office visit - 20 minutes.  -I spent a significant amount of time discussing various issues as noted above and also with formulating a treatment plan for each specific issue. Patient was given the opportunity to ask me any questions and address any concerns/issues. I also reviewed lab work (if available) as well as prior notes from PCP and/or other specialists if available. John Cedeno M.D.   [address] [place] [address]    Electronically signed by Eliazar Baltazar MD M.D. on 2/27/2023 at 9:59 AM.

## 2023-05-20 DIAGNOSIS — F90.2 ATTENTION DEFICIT HYPERACTIVITY DISORDER (ADHD), COMBINED TYPE: ICD-10-CM

## 2023-05-23 RX ORDER — ATOMOXETINE 60 MG/1
CAPSULE ORAL
Qty: 90 CAPSULE | Refills: 0 | Status: SHIPPED | OUTPATIENT
Start: 2023-05-23

## 2023-08-25 ENCOUNTER — OFFICE VISIT (OUTPATIENT)
Dept: FAMILY MEDICINE CLINIC | Age: 43
End: 2023-08-25
Payer: COMMERCIAL

## 2023-08-25 VITALS
DIASTOLIC BLOOD PRESSURE: 70 MMHG | SYSTOLIC BLOOD PRESSURE: 120 MMHG | HEART RATE: 94 BPM | WEIGHT: 194 LBS | BODY MASS INDEX: 29.5 KG/M2 | OXYGEN SATURATION: 96 %

## 2023-08-25 DIAGNOSIS — Z12.31 ENCOUNTER FOR SCREENING MAMMOGRAM FOR MALIGNANT NEOPLASM OF BREAST: ICD-10-CM

## 2023-08-25 DIAGNOSIS — Z02.1 PHYSICAL EXAM, PRE-EMPLOYMENT: Primary | ICD-10-CM

## 2023-08-25 PROCEDURE — 99396 PREV VISIT EST AGE 40-64: CPT | Performed by: CLINICAL NURSE SPECIALIST

## 2023-08-25 ASSESSMENT — PATIENT HEALTH QUESTIONNAIRE - PHQ9
SUM OF ALL RESPONSES TO PHQ QUESTIONS 1-9: 0
9. THOUGHTS THAT YOU WOULD BE BETTER OFF DEAD, OR OF HURTING YOURSELF: 0
2. FEELING DOWN, DEPRESSED OR HOPELESS: 0
4. FEELING TIRED OR HAVING LITTLE ENERGY: 0
SUM OF ALL RESPONSES TO PHQ QUESTIONS 1-9: 0
5. POOR APPETITE OR OVEREATING: 0
SUM OF ALL RESPONSES TO PHQ QUESTIONS 1-9: 0
3. TROUBLE FALLING OR STAYING ASLEEP: 0
6. FEELING BAD ABOUT YOURSELF - OR THAT YOU ARE A FAILURE OR HAVE LET YOURSELF OR YOUR FAMILY DOWN: 0
SUM OF ALL RESPONSES TO PHQ9 QUESTIONS 1 & 2: 0
8. MOVING OR SPEAKING SO SLOWLY THAT OTHER PEOPLE COULD HAVE NOTICED. OR THE OPPOSITE, BEING SO FIGETY OR RESTLESS THAT YOU HAVE BEEN MOVING AROUND A LOT MORE THAN USUAL: 0
SUM OF ALL RESPONSES TO PHQ QUESTIONS 1-9: 0
10. IF YOU CHECKED OFF ANY PROBLEMS, HOW DIFFICULT HAVE THESE PROBLEMS MADE IT FOR YOU TO DO YOUR WORK, TAKE CARE OF THINGS AT HOME, OR GET ALONG WITH OTHER PEOPLE: 0
1. LITTLE INTEREST OR PLEASURE IN DOING THINGS: 0
7. TROUBLE CONCENTRATING ON THINGS, SUCH AS READING THE NEWSPAPER OR WATCHING TELEVISION: 0

## 2023-08-25 ASSESSMENT — ENCOUNTER SYMPTOMS
NAUSEA: 0
CHEST TIGHTNESS: 0
ABDOMINAL PAIN: 0
COLOR CHANGE: 0
RHINORRHEA: 0
WHEEZING: 0
BACK PAIN: 0
CONSTIPATION: 0
COUGH: 0
VOMITING: 0
SHORTNESS OF BREATH: 0
DIARRHEA: 0

## 2023-08-25 NOTE — PROGRESS NOTES
SUBJECTIVE:    Patient ID:  Nahum Javed is a 40 y.o. female      Patient is here for for an employment physical.He/she has no questions or concerns regarding their health. Medications: Strattera 60 mg   Supplements: Vit D  Diet: working on improving diet   Activity: does not work out on a regular basis, has been getting steps in  Safety: Uses sunscreen when out for extended periods of time, wears their seatbelt, does not drink and drive, non-smoker    Divorce is final and she is in the process of starting a new job.      Discussed and encourage breast caner screening, mammogram ordered      Current Outpatient Medications on File Prior to Visit   Medication Sig Dispense Refill    atomoxetine (STRATTERA) 60 MG capsule TAKE 1 CAPSULE BY MOUTH EVERY DAY 90 capsule 0    brompheniramine-pseudoephedrine-DM 2-30-10 MG/5ML syrup Take 5 mLs by mouth every 6 hours as needed for Congestion or Cough 118 mL 0    azelastine (ASTELIN) 0.1 % nasal spray 2 sprays by Nasal route 2 times daily Use in each nostril as directed 120 mL 1    fluticasone (FLONASE) 50 MCG/ACT nasal spray 2 sprays by Each Nostril route daily 1 each 1    ondansetron (ZOFRAN-ODT) 4 MG disintegrating tablet Take 1 tablet by mouth 3 times daily as needed for Nausea or Vomiting 21 tablet 0    fluvoxaMINE (LUVOX) 100 MG tablet Take 1 tablet by mouth nightly 90 tablet 1    hydrOXYzine HCl (ATARAX) 25 MG tablet Take 1 tablet by mouth every 12 hours as needed for Anxiety (insomnia) 90 tablet 1    pramipexole (MIRAPEX) 0.5 MG tablet Take 1 tablet by mouth nightly 90 tablet 1    prochlorperazine (COMPAZINE) 10 MG tablet Take 1 tablet by mouth every 8 hours as needed (nausea) 15 tablet 1    albuterol sulfate HFA (PROVENTIL;VENTOLIN;PROAIR) 108 (90 Base) MCG/ACT inhaler Inhale 2 puffs into the lungs every 4-6 hours as needed for Wheezing or Shortness of Breath 18 g 1    dicyclomine (BENTYL) 20 MG tablet Take 1 tablet by mouth every 8 hours as needed

## 2023-09-25 DIAGNOSIS — F41.8 MIXED ANXIETY AND DEPRESSIVE DISORDER: ICD-10-CM

## 2023-09-25 DIAGNOSIS — F40.10 SOCIAL ANXIETY DISORDER: ICD-10-CM

## 2023-09-25 DIAGNOSIS — F42.2 MIXED OBSESSIONAL THOUGHTS AND ACTS: ICD-10-CM

## 2023-09-25 RX ORDER — FLUVOXAMINE MALEATE 100 MG
100 TABLET ORAL NIGHTLY
Qty: 90 TABLET | Refills: 0 | Status: SHIPPED | OUTPATIENT
Start: 2023-09-25

## 2023-12-24 DIAGNOSIS — F40.10 SOCIAL ANXIETY DISORDER: ICD-10-CM

## 2023-12-24 DIAGNOSIS — F42.2 MIXED OBSESSIONAL THOUGHTS AND ACTS: ICD-10-CM

## 2023-12-24 DIAGNOSIS — F41.8 MIXED ANXIETY AND DEPRESSIVE DISORDER: ICD-10-CM

## 2023-12-27 RX ORDER — FLUVOXAMINE MALEATE 100 MG
100 TABLET ORAL NIGHTLY
Qty: 90 TABLET | Refills: 0 | OUTPATIENT
Start: 2023-12-27